# Patient Record
Sex: FEMALE | Race: WHITE | HISPANIC OR LATINO | URBAN - METROPOLITAN AREA
[De-identification: names, ages, dates, MRNs, and addresses within clinical notes are randomized per-mention and may not be internally consistent; named-entity substitution may affect disease eponyms.]

---

## 2018-10-09 ENCOUNTER — OUTPATIENT (OUTPATIENT)
Dept: OUTPATIENT SERVICES | Facility: HOSPITAL | Age: 33
LOS: 1 days | Discharge: HOME | End: 2018-10-09

## 2018-10-09 ENCOUNTER — APPOINTMENT (OUTPATIENT)
Dept: ANTEPARTUM | Facility: CLINIC | Age: 33
End: 2018-10-09

## 2018-11-01 ENCOUNTER — OUTPATIENT (OUTPATIENT)
Dept: OUTPATIENT SERVICES | Facility: HOSPITAL | Age: 33
LOS: 1 days | Discharge: HOME | End: 2018-11-01

## 2018-11-01 ENCOUNTER — APPOINTMENT (OUTPATIENT)
Dept: ANTEPARTUM | Facility: CLINIC | Age: 33
End: 2018-11-01

## 2018-12-20 ENCOUNTER — APPOINTMENT (OUTPATIENT)
Dept: ANTEPARTUM | Facility: CLINIC | Age: 33
End: 2018-12-20

## 2018-12-20 ENCOUNTER — RESULT CHARGE (OUTPATIENT)
Age: 33
End: 2018-12-20

## 2018-12-20 ENCOUNTER — OUTPATIENT (OUTPATIENT)
Dept: OUTPATIENT SERVICES | Facility: HOSPITAL | Age: 33
LOS: 1 days | Discharge: HOME | End: 2018-12-20

## 2018-12-20 VITALS
WEIGHT: 164.5 LBS | BODY MASS INDEX: 30.27 KG/M2 | TEMPERATURE: 97.7 F | HEART RATE: 89 BPM | HEIGHT: 62 IN | OXYGEN SATURATION: 99 % | DIASTOLIC BLOOD PRESSURE: 68 MMHG | SYSTOLIC BLOOD PRESSURE: 116 MMHG

## 2018-12-20 DIAGNOSIS — Z87.09 PERSONAL HISTORY OF OTHER DISEASES OF THE RESPIRATORY SYSTEM: ICD-10-CM

## 2018-12-20 DIAGNOSIS — Z78.9 OTHER SPECIFIED HEALTH STATUS: ICD-10-CM

## 2018-12-20 LAB
BILIRUB UR QL STRIP: NEGATIVE
CLARITY UR: NORMAL
COLLECTION METHOD: NORMAL
FETAL MOVEMENT: PRESENT
GLUCOSE UR-MCNC: NEGATIVE
HCG UR QL: 0.2 EU/DL
HGB UR QL STRIP.AUTO: NEGATIVE
KETONES UR-MCNC: NEGATIVE
LEUKOCYTE ESTERASE UR QL STRIP: NEGATIVE
NITRITE UR QL STRIP: NEGATIVE
OB COMMENTS: NORMAL
PH UR STRIP: 6.5
PROT UR STRIP-MCNC: 30
SCHEDULED VISIT: YES
SP GR UR STRIP: 1.02
URINE ALBUMIN/PROTEIN: 30
URINE GLUCOSE: NEGATIVE
URINE KETONES: NEGATIVE
WEEKS GESTATION: NORMAL

## 2018-12-20 RX ORDER — CLOTRIMAZOLE 1% ANTIFUNGAL CREAM 1 G/100G
1 CREAM TOPICAL
Qty: 28 | Refills: 0 | Status: DISCONTINUED | COMMUNITY
Start: 2018-12-04

## 2018-12-20 RX ORDER — NITROFURANTOIN (MONOHYDRATE/MACROCRYSTALS) 25; 75 MG/1; MG/1
100 CAPSULE ORAL
Qty: 14 | Refills: 0 | Status: DISCONTINUED | COMMUNITY
Start: 2018-12-06

## 2018-12-20 RX ORDER — METRONIDAZOLE 500 MG/1
500 TABLET ORAL
Qty: 4 | Refills: 0 | Status: DISCONTINUED | COMMUNITY
Start: 2018-12-13

## 2018-12-20 RX ORDER — METRONIDAZOLE 7.5 MG/G
0.75 GEL VAGINAL
Qty: 70 | Refills: 0 | Status: DISCONTINUED | COMMUNITY
Start: 2018-10-04

## 2019-02-14 ENCOUNTER — APPOINTMENT (OUTPATIENT)
Dept: ANTEPARTUM | Facility: CLINIC | Age: 34
End: 2019-02-14

## 2019-03-05 ENCOUNTER — APPOINTMENT (OUTPATIENT)
Dept: ANTEPARTUM | Facility: CLINIC | Age: 34
End: 2019-03-05

## 2019-03-19 ENCOUNTER — APPOINTMENT (OUTPATIENT)
Dept: ANTEPARTUM | Facility: CLINIC | Age: 34
End: 2019-03-19

## 2019-03-26 ENCOUNTER — OUTPATIENT (OUTPATIENT)
Dept: OUTPATIENT SERVICES | Facility: HOSPITAL | Age: 34
LOS: 1 days | Discharge: HOME | End: 2019-03-26

## 2019-03-26 VITALS — SYSTOLIC BLOOD PRESSURE: 131 MMHG | DIASTOLIC BLOOD PRESSURE: 73 MMHG | HEART RATE: 107 BPM | TEMPERATURE: 98 F

## 2019-03-26 VITALS — DIASTOLIC BLOOD PRESSURE: 73 MMHG | SYSTOLIC BLOOD PRESSURE: 131 MMHG | RESPIRATION RATE: 16 BRPM | HEART RATE: 107 BPM

## 2019-03-26 NOTE — OB PROVIDER TRIAGE NOTE - NSHPLABSRESULTS_GEN_ALL_CORE
SONO @ 19w5d: cephalic, 3vc, posterior placenta, normal cervix, , FHR 138bpm, normal anatomy   SONO @ 12w5d: single IUP, cervical length 43mm, CRL 68.85mm, FHR 142bpm SONO @ 19w5d: cephalic, 3vc, posterior placenta, normal cervix, , FHR 138bpm, normal anatomy   SONO @ 12w5d: single IUP, cervical length 43mm, CRL 68.85mm, FHR 142bpm    No Prenatal Chart    12/7/18  HepBSAg Negative  RPR NR  HIV Neg  Rubella/ Varicella Immune  OPOS

## 2019-03-26 NOTE — OB PROVIDER TRIAGE NOTE - HISTORY OF PRESENT ILLNESS
34yo  at 33w3d, dated by LMP confirmed with 1st trimester ultrasound, who presents with hands and feet swelling, present for 1 week, with numbness and tingling in her hands. She states her numbness radiates from her elbows to thumbs, and is mostly present in her thumbs. She reports the numbness is worse at nighttime. No alleviating factors. She is currently not working, but used to work on [employer]. Denies contractions, pain with urination, vaginal bleeding, loss of fluid, chest pain, SOB. She reports her feet pain is more of, worse with sunlight. Denies nausea or vomiting. Reports blurry vision only when watching TV, denies scotoma. Denies history of HTN, She has not done her GCT yet. Last apt was today, 19. Takes albuterol for ashtma, TID on average, no previous hospitalizations or intubations. 32yo  at 33w3d, dated by LMP confirmed with 1st trimester ultrasound, who presents with hands and feet swelling, present for 1 week, with numbness and tingling in her hands. She states her numbness radiates from her elbows to thumbs, and is mostly present in her thumbs. She reports the numbness is worse at nighttime. No alleviating factors. 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 Denies contractions, pain with urination, vaginal bleeding, loss of fluid, chest pain, SOB. Denies history of HTN, She has not done her GCT yet. Last apt was today, 19. Takes albuterol for Asthma, TID on average, no previous hospitalizations or intubations.  Last sexual intercourse 1 week ago, last bowel movement today. Saw pulmonology in february no change to current medication regimen, missed apt scheduled in March, will be following up. Has been non-compliant with prenatal care, next ObGyn apt on 4/3/19 with SEAN.

## 2019-03-26 NOTE — OB PROVIDER TRIAGE NOTE - NSOBPROVIDERNOTE_OBGYN_ALL_OB_FT
32yo  at 33w3d, with asthma on daily albuterol,    Dr. Pierson and Dr. Mendez to be aware 32yo  at 33w3d, with severe persistent asthma on daily albuterol, likely with carpel tunnel syndrome, with BPP 8/8, and reassuring maternal and fetal wellbeing, not in  labor.   -Discharge with labor precautions  -Follow up with [facility], high risk clinic for prenatal care   -Fetal Kick Counts encouraged   -PO hydration  -Nighttime wrist splints encouraged   -F/u with Pulmonology   -Compliance with prenatal care emphasized to patient. Risks of noncompliance discussed, including, but not limited to infection, bleeding and fetal death.     Dr. Pierson and Dr. Mendez aware

## 2019-03-26 NOTE — OB PROVIDER TRIAGE NOTE - NS_OBGYNHISTORY_OBGYN_ALL_OB_FT
OB: NSVDX3, term, 40wx3 (RUMC +Bron[place]X2) ETOP with D+E @ 19w, ETOPX3 with D+C  Gyn: Denies history of abnormal papsmears, STIs, uterine fibroids or ovarian cysts.

## 2019-03-26 NOTE — OB PROVIDER TRIAGE NOTE - NSHPPHYSICALEXAM_GEN_ALL_CORE
Vital Signs Last 24 Hrs  T(C): 36.9 (26 Mar 2019 18:57), Max: 36.9 (26 Mar 2019 18:57)  T(F): 98.5 (26 Mar 2019 18:57), Max: 98.5 (26 Mar 2019 18:57)  HR: 107 (26 Mar 2019 19:10) (107 - 107)  BP: 131/73 (26 Mar 2019 19:10) (131/73 - 131/73)  RR: 16 (26 Mar 2019 19:10) (16 - 16)    Gen: A+OX3. NAD  Abd: soft, nontender, gravid, mild palpable contractions  EFM: 140bpm/ mod martin/ accels+/cat 1  TOCO irregular, irritability   SVE  Speculum exam    UDIP  small leuks Vital Signs Last 24 Hrs  T(C): 36.9 (26 Mar 2019 18:57), Max: 36.9 (26 Mar 2019 18:57)  T(F): 98.5 (26 Mar 2019 18:57), Max: 98.5 (26 Mar 2019 18:57)  HR: 107 (26 Mar 2019 19:10) (107 - 107); Manual 96   BP: 131/73 (26 Mar 2019 19:10) (131/73 - 131/73)  RR: 16 (26 Mar 2019 19:10) (16 - 16)    Gen: A+OX3. NAD  CV: S1+S2. RRR  Pulm: CTAB. No wheezes.   Abd: soft, nontender, gravid, mild palpable contractions  EFM: 140bpm/ mod martin/ accels+/cat 1  TOCO irregular, irritability   SVE 0.5/long/ posterior/ breech  Speculum exam: No bleeding noted   TVUS: cervical length 3.51cm     UDIP  small leuks

## 2019-04-03 ENCOUNTER — OUTPATIENT (OUTPATIENT)
Dept: OUTPATIENT SERVICES | Facility: HOSPITAL | Age: 34
LOS: 1 days | Discharge: HOME | End: 2019-04-03

## 2019-04-03 ENCOUNTER — APPOINTMENT (OUTPATIENT)
Dept: ANTEPARTUM | Facility: CLINIC | Age: 34
End: 2019-04-03

## 2019-04-03 ENCOUNTER — RESULT CHARGE (OUTPATIENT)
Age: 34
End: 2019-04-03

## 2019-04-03 DIAGNOSIS — Z78.9 OTHER SPECIFIED HEALTH STATUS: ICD-10-CM

## 2019-04-03 LAB
BILIRUB UR QL STRIP: NEGATIVE
CLARITY UR: CLEAR
COLLECTION METHOD: NORMAL
FETAL HEART RATE (BPM): 153
FETAL MOVEMENT: PRESENT
GLUCOSE UR-MCNC: NEGATIVE
HCG UR QL: 0.2 EU/DL
HGB UR QL STRIP.AUTO: NEGATIVE
KETONES UR-MCNC: NORMAL
LEUKOCYTE ESTERASE UR QL STRIP: NEGATIVE
NITRITE UR QL STRIP: NEGATIVE
PH UR STRIP: 6
PROT UR STRIP-MCNC: 2
SP GR UR STRIP: 1.03
URINE ALBUMIN/PROTEIN: 2
URINE GLUCOSE: NEGATIVE
URINE KETONES: NORMAL

## 2019-04-03 RX ORDER — ALBUTEROL SULFATE 90 UG/1
108 (90 BASE) AEROSOL, METERED RESPIRATORY (INHALATION)
Qty: 18 | Refills: 0 | Status: COMPLETED | COMMUNITY
Start: 2018-12-13 | End: 2019-04-03

## 2019-04-03 RX ORDER — MONTELUKAST 10 MG/1
10 TABLET, FILM COATED ORAL
Qty: 30 | Refills: 0 | Status: COMPLETED | COMMUNITY
Start: 2018-11-15 | End: 2019-04-03

## 2019-04-03 NOTE — PHYSICAL EXAM
[FreeTextEntry1] : Gen: NAD\par Cardio: rrr, s1/s2 normal\par Pulm: cta bilaterally\par Abd: gravid, nontender, no palpable contractions, fundal height 33\par Ext: no edema, no calf tenderness

## 2019-04-03 NOTE — DISCUSSION/SUMMARY
[FreeTextEntry1] : Pt is 33  at 34ks4d here transfer from [facility] for prenatal care and mgmt of asthma\par \par #asthma\par -c/w albuterol prn, self d/c ventolin ok for now given lack of symptoms\par -continue following with pulm\par  labor/delivery, increased risk of exacerbations and need for possible maternal intubation.\par -will need serial EFW scans qmonth, and weekly BPPs starting at 32wks\par \par #pregnancy\par -pt transferring care to [facility]\par -will obtain prenatal chart from [facility] for review\par \par #carpal tunnel\par -recc wrist splints\par -tylenol PM for sleep\par \par #nausea/vomiting\par - ketonuria\par - encouraged to stay hydrated and eat small bland meals\par - if symptoms do not improve or worsen and not able to tolerate liquid pt to go to the hospital\par \par #proteinuria\par -udip 2+ today\par -BP ok today will monitor\par -will review BPs and urine in prenatal record when received\par \par  labor precautions and instructions given, FKC instructions given\par RTC in 1wk

## 2019-04-03 NOTE — HISTORY OF PRESENT ILLNESS
[FreeTextEntry1] : Pt is 33  at [id number]  previously seen as consult from Dr Simon at [facility] for asthma and possible thyroid dysfuction.  Pt presents today for routine OB care, says that when her doctor left [facility] she was told she could no longer see an OB there, so she has not been followed by anyone since 2019.  Pt also has a child who recently had surgery and she was spending time at home with him.\par \par Pt called the office 3/26 c/o swelling and was encouraged to go to the hospital.  Pt was evaluated on L&D and found to have bilateral carpal tunnel and was recommended wrist splints which she has been using without much symptom relief.  Pt also taking tylenol for the pain which is not helping.  Pt reports the numbness and tingling is interfering with her sleeping at night.  Recommended tylenol PM to help with discomfort and sleep and to continue with the wrist splints.\par \par Pt asthma much improved since initial consult.  Has albuterol pump and ventolin at home which she has not had to use.  Last albuterol use was 2wks ago.  Pt has a pulmonologist with whom she follows, last appt 2019.\par \par Pt also was told by previous gyn that she may have thyroid problem, had TFTs performed by OB at [facility] that were normal (per pt).  Pt has never been told she had a thyroid condition before and does not have symptoms of hypo or hypothyroidism.\par \par Pt also notes that for the last 2 days she has had some nausea/vomiting associated with meals, unable to keep down solid or liquids.  Pt denies diarrhea, fevers/chills, travel, changes in diet.  This morning pt notes that she is feeling better and was able to tolerate sandwich and fruit without vomiting and does not feel nauseous at this time.  Pt with ketones in the urine, encouraged to drink more fluids and stay hydrated.\par \par /83 today, udip 2+ protein.  Pt asked about BPs this pregnancy, says she was told at one visit her blood pressure was high and at another visit that it was low.  Denies headaches, vision changes, RUQ/epigastric pain, sudden increased swelling extremities.

## 2019-04-04 DIAGNOSIS — O99.513 DISEASES OF THE RESPIRATORY SYSTEM COMPLICATING PREGNANCY, THIRD TRIMESTER: ICD-10-CM

## 2019-04-04 DIAGNOSIS — J45.20 MILD INTERMITTENT ASTHMA, UNCOMPLICATED: ICD-10-CM

## 2019-04-04 DIAGNOSIS — O09.93 SUPERVISION OF HIGH RISK PREGNANCY, UNSPECIFIED, THIRD TRIMESTER: ICD-10-CM

## 2019-04-04 DIAGNOSIS — Z3A.34 34 WEEKS GESTATION OF PREGNANCY: ICD-10-CM

## 2019-04-04 DIAGNOSIS — O35.8XX0 MATERNAL CARE FOR OTHER (SUSPECTED) FETAL ABNORMALITY AND DAMAGE, NOT APPLICABLE OR UNSPECIFIED: ICD-10-CM

## 2019-04-10 ENCOUNTER — APPOINTMENT (OUTPATIENT)
Dept: ANTEPARTUM | Facility: CLINIC | Age: 34
End: 2019-04-10

## 2019-04-10 ENCOUNTER — OUTPATIENT (OUTPATIENT)
Dept: OUTPATIENT SERVICES | Facility: HOSPITAL | Age: 34
LOS: 1 days | Discharge: HOME | End: 2019-04-10

## 2019-04-10 VITALS
WEIGHT: 183 LBS | HEART RATE: 98 BPM | OXYGEN SATURATION: 100 % | BODY MASS INDEX: 33.47 KG/M2 | TEMPERATURE: 98.1 F | DIASTOLIC BLOOD PRESSURE: 79 MMHG | SYSTOLIC BLOOD PRESSURE: 134 MMHG

## 2019-04-10 DIAGNOSIS — Z34.90 ENCOUNTER FOR SUPERVISION OF NORMAL PREGNANCY, UNSPECIFIED, UNSPECIFIED TRIMESTER: ICD-10-CM

## 2019-04-10 LAB
BILIRUB UR QL STRIP: NEGATIVE
CLARITY UR: CLEAR
COLLECTION METHOD: NORMAL
FETAL HEART DESCRIPTION: NORMAL
FETAL HEART RATE (BPM): 132
FETAL MOVEMENT: PRESENT
GLUCOSE UR-MCNC: NEGATIVE
HCG UR QL: 0.2 EU/DL
HGB UR QL STRIP.AUTO: NEGATIVE
KETONES UR-MCNC: NEGATIVE
LEUKOCYTE ESTERASE UR QL STRIP: NEGATIVE
NITRITE UR QL STRIP: NEGATIVE
PH UR STRIP: 6.5
PROT UR STRIP-MCNC: NORMAL
SCHEDULED VISIT: YES
SP GR UR STRIP: 1.01
URINE ALBUMIN/PROTEIN: NORMAL
URINE GLUCOSE: NEGATIVE
URINE KETONES: NEGATIVE
WEEKS GESTATION: 35.4

## 2019-04-10 RX ORDER — CETIRIZINE HYDROCHLORIDE 10 MG/1
10 TABLET, COATED ORAL
Qty: 10 | Refills: 0 | Status: COMPLETED | COMMUNITY
Start: 2019-01-18

## 2019-04-10 RX ORDER — CALCIPOTRIENE 50 UG/G
0.01 CREAM TOPICAL
Qty: 60 | Refills: 0 | Status: COMPLETED | COMMUNITY
Start: 2018-12-05

## 2019-04-11 DIAGNOSIS — J45.20 MILD INTERMITTENT ASTHMA, UNCOMPLICATED: ICD-10-CM

## 2019-04-11 DIAGNOSIS — O99.513 DISEASES OF THE RESPIRATORY SYSTEM COMPLICATING PREGNANCY, THIRD TRIMESTER: ICD-10-CM

## 2019-04-11 DIAGNOSIS — Z3A.35 35 WEEKS GESTATION OF PREGNANCY: ICD-10-CM

## 2019-04-11 DIAGNOSIS — O12.13 GESTATIONAL PROTEINURIA, THIRD TRIMESTER: ICD-10-CM

## 2019-04-11 DIAGNOSIS — O09.93 SUPERVISION OF HIGH RISK PREGNANCY, UNSPECIFIED, THIRD TRIMESTER: ICD-10-CM

## 2019-04-11 NOTE — DISCUSSION/SUMMARY
[FreeTextEntry1] : Pt is 33  at 35wk4d here transfer from [facility] for prenatal care and mgmt of asthma\par \par Gen: NAD, voice hoarse\par Cardio: rrr, s1/s2 normal\par Pulm: cta b/l\par Abd: gravid, nontender, no palpable contractions\par Ext: 1+ edema, no calf tenderness\par \par #asthma\par -c/w albuterol prn, self d/c ventolin\par -symptoms worse the past week, daily albuterol use, has URI, no fevers, cough nonproductive, lungs clear no wheezing, recc decongestants\par -continue following with pulm\par -will need serial EFW scans qmonth, last 4/3/19 EFW 2462g, vtx, MVP 53mm, BPP 8/8\par \par #pregnancy\par -pt transferring care to [facility]\par -will obtain prenatal chart from [facility] for review --> received labs, contacted again to get notes from prenatal visit\par -GBS, GC/Chl collected today, script for 3rd trimester HIV\par \par #carpal tunnel\par -using wrist splints and symptoms much improved\par \par #nausea/vomiting\par - resolved\par \par #proteinuria\par -udip 2+ again today, normal BP\par -will send for preeclamptic labwork\par \par  labor precautions and instructions given, FKC instructions given\par RTC in 1wk

## 2019-04-12 ENCOUNTER — RESULT REVIEW (OUTPATIENT)
Age: 34
End: 2019-04-12

## 2019-04-12 LAB
C TRACH RRNA SPEC QL NAA+PROBE: NOT DETECTED
GP B STREP DNA SPEC QL NAA+PROBE: DETECTED
GP B STREP DNA SPEC QL NAA+PROBE: NORMAL
N GONORRHOEA RRNA SPEC QL NAA+PROBE: NOT DETECTED
SOURCE AMPLIFICATION: NORMAL
SOURCE GBS: NORMAL

## 2019-04-17 ENCOUNTER — APPOINTMENT (OUTPATIENT)
Dept: ANTEPARTUM | Facility: CLINIC | Age: 34
End: 2019-04-17

## 2019-04-19 ENCOUNTER — OTHER (OUTPATIENT)
Age: 34
End: 2019-04-19

## 2019-04-26 ENCOUNTER — RESULT CHARGE (OUTPATIENT)
Age: 34
End: 2019-04-26

## 2019-04-26 ENCOUNTER — APPOINTMENT (OUTPATIENT)
Dept: ANTEPARTUM | Facility: CLINIC | Age: 34
End: 2019-04-26

## 2019-04-26 ENCOUNTER — OUTPATIENT (OUTPATIENT)
Dept: OUTPATIENT SERVICES | Facility: HOSPITAL | Age: 34
LOS: 1 days | Discharge: HOME | End: 2019-04-26

## 2019-04-26 VITALS
TEMPERATURE: 98.1 F | BODY MASS INDEX: 33.56 KG/M2 | SYSTOLIC BLOOD PRESSURE: 149 MMHG | OXYGEN SATURATION: 98 % | HEART RATE: 100 BPM | DIASTOLIC BLOOD PRESSURE: 80 MMHG | WEIGHT: 183.5 LBS

## 2019-04-26 VITALS — DIASTOLIC BLOOD PRESSURE: 89 MMHG | SYSTOLIC BLOOD PRESSURE: 135 MMHG | TEMPERATURE: 98 F

## 2019-04-26 VITALS — DIASTOLIC BLOOD PRESSURE: 60 MMHG | SYSTOLIC BLOOD PRESSURE: 129 MMHG | HEART RATE: 102 BPM

## 2019-04-26 DIAGNOSIS — J45.20 MILD INTERMITTENT ASTHMA, UNCOMPLICATED: ICD-10-CM

## 2019-04-26 DIAGNOSIS — O12.13 GESTATIONAL PROTEINURIA, THIRD TRIMESTER: ICD-10-CM

## 2019-04-26 DIAGNOSIS — O09.93 SUPERVISION OF HIGH RISK PREGNANCY, UNSPECIFIED, THIRD TRIMESTER: ICD-10-CM

## 2019-04-26 DIAGNOSIS — Z3A.37 37 WEEKS GESTATION OF PREGNANCY: ICD-10-CM

## 2019-04-26 LAB
ALBUMIN SERPL ELPH-MCNC: 3.2 G/DL — LOW (ref 3.5–5.2)
ALP SERPL-CCNC: 147 U/L — HIGH (ref 30–115)
ALT FLD-CCNC: 6 U/L — SIGNIFICANT CHANGE UP (ref 0–41)
AMPHET UR-MCNC: NEGATIVE — SIGNIFICANT CHANGE UP
ANION GAP SERPL CALC-SCNC: 13 MMOL/L — SIGNIFICANT CHANGE UP (ref 7–14)
APPEARANCE UR: ABNORMAL
AST SERPL-CCNC: 13 U/L — SIGNIFICANT CHANGE UP (ref 0–41)
BACTERIA # UR AUTO: ABNORMAL /HPF
BARBITURATES UR SCN-MCNC: NEGATIVE — SIGNIFICANT CHANGE UP
BENZODIAZ UR-MCNC: NEGATIVE — SIGNIFICANT CHANGE UP
BILIRUB SERPL-MCNC: 0.2 MG/DL — SIGNIFICANT CHANGE UP (ref 0.2–1.2)
BILIRUB UR QL STRIP: NEGATIVE
BILIRUB UR-MCNC: NEGATIVE — SIGNIFICANT CHANGE UP
BLD GP AB SCN SERPL QL: SIGNIFICANT CHANGE UP
BUN SERPL-MCNC: 8 MG/DL — LOW (ref 10–20)
BUPRENORPHINE SCREEN, URINE RESULT: NEGATIVE — SIGNIFICANT CHANGE UP
CALCIUM SERPL-MCNC: 8.7 MG/DL — SIGNIFICANT CHANGE UP (ref 8.5–10.1)
CHLORIDE SERPL-SCNC: 104 MMOL/L — SIGNIFICANT CHANGE UP (ref 98–110)
CLARITY UR: CLEAR
CO2 SERPL-SCNC: 19 MMOL/L — SIGNIFICANT CHANGE UP (ref 17–32)
COCAINE METAB.OTHER UR-MCNC: NEGATIVE — SIGNIFICANT CHANGE UP
COLLECTION METHOD: NORMAL
COLOR SPEC: YELLOW — SIGNIFICANT CHANGE UP
CREAT ?TM UR-MCNC: 96 MG/DL — SIGNIFICANT CHANGE UP
CREAT SERPL-MCNC: <0.5 MG/DL — LOW (ref 0.7–1.5)
DIFF PNL FLD: NEGATIVE — SIGNIFICANT CHANGE UP
EPI CELLS # UR: ABNORMAL /HPF
FETAL HEART DESCRIPTION: NORMAL
FETAL HEART RATE (BPM): 140
FETAL MOVEMENT: PRESENT
GLUCOSE SERPL-MCNC: 82 MG/DL — SIGNIFICANT CHANGE UP (ref 70–99)
GLUCOSE UR QL: NEGATIVE MG/DL — SIGNIFICANT CHANGE UP
GLUCOSE UR-MCNC: NEGATIVE
HCG UR QL: 0.2 EU/DL
HCT VFR BLD CALC: 31.5 % — LOW (ref 37–47)
HGB BLD-MCNC: 10 G/DL — LOW (ref 12–16)
HGB UR QL STRIP.AUTO: NEGATIVE
HIV 1 & 2 AB SERPL IA.RAPID: SIGNIFICANT CHANGE UP
KETONES UR-MCNC: NEGATIVE
KETONES UR-MCNC: NEGATIVE — SIGNIFICANT CHANGE UP
L&D DRUG SCREEN, URINE: SIGNIFICANT CHANGE UP
LDH SERPL L TO P-CCNC: 204 — SIGNIFICANT CHANGE UP (ref 50–242)
LEUKOCYTE ESTERASE UR QL STRIP: NEGATIVE
LEUKOCYTE ESTERASE UR-ACNC: ABNORMAL
MCHC RBC-ENTMCNC: 25.2 PG — LOW (ref 27–31)
MCHC RBC-ENTMCNC: 31.7 G/DL — LOW (ref 32–37)
MCV RBC AUTO: 79.3 FL — LOW (ref 81–99)
METHADONE UR-MCNC: NEGATIVE — SIGNIFICANT CHANGE UP
NITRITE UR QL STRIP: NEGATIVE
NITRITE UR-MCNC: NEGATIVE — SIGNIFICANT CHANGE UP
NRBC # BLD: 0 /100 WBCS — SIGNIFICANT CHANGE UP (ref 0–0)
OB COMMENTS: NORMAL
OPIATES UR-MCNC: NEGATIVE — SIGNIFICANT CHANGE UP
OXYCODONE UR-MCNC: NEGATIVE — SIGNIFICANT CHANGE UP
PCP UR-MCNC: NEGATIVE — SIGNIFICANT CHANGE UP
PH UR STRIP: 7
PH UR: 7 — SIGNIFICANT CHANGE UP (ref 5–8)
PLATELET # BLD AUTO: 321 K/UL — SIGNIFICANT CHANGE UP (ref 130–400)
POTASSIUM SERPL-MCNC: 4 MMOL/L — SIGNIFICANT CHANGE UP (ref 3.5–5)
POTASSIUM SERPL-SCNC: 4 MMOL/L — SIGNIFICANT CHANGE UP (ref 3.5–5)
PRENATAL SYPHILIS TEST: SIGNIFICANT CHANGE UP
PROPOXYPHENE QUALITATIVE URINE RESULT: NEGATIVE — SIGNIFICANT CHANGE UP
PROT ?TM UR-MCNC: 48 MG/DLG/24H — SIGNIFICANT CHANGE UP
PROT SERPL-MCNC: 6.2 G/DL — SIGNIFICANT CHANGE UP (ref 6–8)
PROT UR STRIP-MCNC: 30
PROT UR-MCNC: 30 MG/DL
PROT/CREAT UR-RTO: 0.5 RATIO — HIGH (ref 0–0.2)
RBC # BLD: 3.97 M/UL — LOW (ref 4.2–5.4)
RBC # FLD: 14.9 % — HIGH (ref 11.5–14.5)
SCHEDULED VISIT: YES
SODIUM SERPL-SCNC: 136 MMOL/L — SIGNIFICANT CHANGE UP (ref 135–146)
SP GR SPEC: 1.02 — SIGNIFICANT CHANGE UP (ref 1.01–1.03)
SP GR UR STRIP: 1.03
TYPE + AB SCN PNL BLD: SIGNIFICANT CHANGE UP
URATE SERPL-MCNC: 3.9 MG/DL — SIGNIFICANT CHANGE UP (ref 2.5–7)
URINE ALBUMIN/PROTEIN: 30
URINE GLUCOSE: NEGATIVE
URINE KETONES: NEGATIVE
UROBILINOGEN FLD QL: 1 MG/DL (ref 0.2–0.2)
WBC # BLD: 14.36 K/UL — HIGH (ref 4.8–10.8)
WBC # FLD AUTO: 14.36 K/UL — HIGH (ref 4.8–10.8)
WBC UR QL: SIGNIFICANT CHANGE UP /HPF
WEEKS GESTATION: 37.6

## 2019-04-26 NOTE — PROGRESS NOTE ADULT - ASSESSMENT
32 yo  @37w6d, GBS pos, h/o asthma now well controlled, s/p prolonged BP monitoring, no criteria met, doing well.    - d/c to home  - f/up at next scheduled appointment at high risk clinic  - labor precautions  - [facility]KC instructions, maternal hydration encouraged  - if experiencing pre-eclamptic symptoms or elevated BP >140/90, return for evaluation    Dr. Knight and Dr. Bañuelos aware.

## 2019-04-26 NOTE — OB PROVIDER TRIAGE NOTE - ADDITIONAL INSTRUCTIONS
- f/up at next scheduled appointment at high risk clinic  - labor precautions  - FKC instructions, maternal hydration encouraged  - if experiencing pre-eclamptic symptoms or elevated BP >140/90, return for evaluation

## 2019-04-26 NOTE — OB PROVIDER TRIAGE NOTE - NSOBPROVIDERNOTE_OBGYN_ALL_OB_FT
34 y/o  at 37w6d GA, GBS positive, h/o asthma now well controlled, for prolonged BP monitoring, r/o gHTN vs preeclampsia.   - transfer to recovery room  - BPs q15 min  - continuous EFM and toco  - preeclamptic labs  - bedrest with bathroom privleges  - regular diet  - if meets criteria for gHTN or preeclampsia will proceed with IOL    Dr. Knight and Dr. Bourgeois to be made aware.

## 2019-04-26 NOTE — OB PROVIDER TRIAGE NOTE - NS_OBGYNHISTORY_OBGYN_ALL_OB_FT
OB Hx: NSVDX3, full term, no complications, largest 7-6  ETOPX4, D&CX3, D&EX1 at 19 weeks  Gyn Hx: Denies cysts, fibroids, abnormal PAP smears and STDs.

## 2019-04-26 NOTE — OB PROVIDER TRIAGE NOTE - HISTORY OF PRESENT ILLNESS
32 y/o  at 37w6d dated by LMP c/w first trimester sonogram, presents for blood pressure monitoring. Patient follows at high risk clinic for asthma control. Today had an elevated blood pressure of 149/80. Denies elevated blood pressures prior in this pregnancy or previous pregnancies. Denies headache, blurry vision, chest pain, SOB, epigastric pain and leg swelling. Reports irregular contractions in the past few days 5/10 in intensity. Denies vaginal bleeding and LOF. Feels good fetal movements. No complications this pregnancy. Patient has a h/o asthma, never hospitalized or intubated. Early in the pregnancy her asthma was very uncontrolled, was on albuterol, singulair and steroid pump. Recently asthma has improved significantly. Last used albuterol inhaler in the beginning of 2019. Not currently taking her other asthma meds. H/o carpal tunnel syndrome this pregnancy, improved with use of splints. GBS positive

## 2019-04-26 NOTE — OB PROVIDER TRIAGE NOTE - NSHPLABSRESULTS_GEN_ALL_CORE
GBS positive  RPR NR  rubella immune  varicella immune  O positive, antibody screen negative  quantiferon negative  HIV NR  Hbs Ag NR        Sonograms:  34w4d - cephalic, posterior placenta, MVP 53mm, EFW 2462 grams (46%ile), BPP 8/8

## 2019-04-26 NOTE — PROGRESS NOTE ADULT - SUBJECTIVE AND OBJECTIVE BOX
PGY I Note    S: Pt seen and evaluated at bedside. Doing well, no pain. Denies HA, blurry vision, CP, SOB, N/V, RUQ/epigastric pain, LE pain.    Vital Signs Last 24 Hrs  T(F): 98.6 (2019 11:05), Max: 98.6 (2019 11:05)  HR: 102 (2019 16:05) (83 - 106)  BP: 129/60 (2019 16:05) (117/76 - 139/90)  RR: 18 (2019 11:05) (18 - 18)    EFM: 130/mod martin/+accel  TOCO: irregular  SVE: deferred, /-2 @1000    Labs:                        10.0   14.36 )-----------( 321      ( 2019 11:10 )             31.5           136  |  104  |  8<L>  ----------------------------<  82  4.0   |  19  |  <0.5<L>    Ca    8.7      2019 11:10    TPro  6.2  /  Alb  3.2<L>  /  TBili  0.2  /  DBili  x   /  AST  13  /  ALT  6   /  AlkPhos  147<H>        Urinalysis Basic - ( 2019 11:10 )    Color: Yellow / Appearance: Cloudy / S.020 / pH: x  Gluc: x / Ketone: Negative  / Bili: Negative / Urobili: 1.0 mg/dL   Blood: x / Protein: 30 mg/dL / Nitrite: Negative   Leuk Esterase: Small / RBC: x / WBC 3-5 /HPF   Sq Epi: x / Non Sq Epi: Occasional /HPF / Bacteria: Few /HPF        Prenatal Syphilis Test: Nonreact (19 @ 11:19)      Meds:

## 2019-04-26 NOTE — OB PROVIDER TRIAGE NOTE - NSHPPHYSICALEXAM_GEN_ALL_CORE
Vital Signs Last 24 Hrs  T(C): 37 (26 Apr 2019 11:05), Max: 37 (26 Apr 2019 11:05)  T(F): 98.6 (26 Apr 2019 11:05), Max: 98.6 (26 Apr 2019 11:05)  HR: 96 (26 Apr 2019 11:36) (93 - 100)  BP: 134/90 (26 Apr 2019 11:36) (134/90 - 139/90)  BP(mean): --  RR: 18 (26 Apr 2019 11:05) (18 - 18)  SpO2: --  Udip: 30 protein    GEN: NAD, AAOX3  CVS: RRR, normal S1/S2  lungs: CTAB  abd: soft, gravid, nontender, no palpable ctx  EFM: 135/mod/accels+  toco: q2-5 min.  SVE: 2/50/-2, cephalic, intact  EFW: 3600 grams    Bedside sonogram at the high risk clinic today: vertex, posterior placenta, MVP 50mm, BPP 8/8

## 2019-04-26 NOTE — DISCUSSION/SUMMARY
[FreeTextEntry1] : Pt is 33  at 37wk6d here transfer from [facility] for prenatal care and mgmt of asthma. Denies any preeclamptic symptoms today. Only reports occasional strong cramping, now resolved. No VB, LOF. Good FM.\par \par #asthma\par -c/w albuterol prn, self d/c ventolin\par -symptoms better than last visit, using albuterol pump occasionally, 4x in last month per patient, no wheezing today, lung exam clear\par -continue following with pulm\par -will need serial EFW scans qmonth, last 4/3/19 EFW 2462g, vtx, MVP 53mm, BPP \par \par #pregnancy\par -will obtain prenatal chart from [facility] for review --> received labs, contacted again to get notes from prenatal visit\par -GBS positive, Gc/Ch neg, patient aware\par -did not go for third trim lasbs\par \par #carpal tunnel\par -using wrist splints and symptoms much improved\par \par #nausea/vomiting\par - resolved\par \par #elevated BP\par -udip 30 protein\par -did not do baseline PELs last visit bc of car problems\par -/80\par -recommend that patient go to L&D now to rule out preeclampsia.\par - BPP today , MVP 50mm, ceph, posterior placenta\par \par Labor and preeclamptic precautions and instructions given, FKC instructions given\par RTC in 1wk if not delivered

## 2019-05-01 ENCOUNTER — OUTPATIENT (OUTPATIENT)
Dept: OUTPATIENT SERVICES | Facility: HOSPITAL | Age: 34
LOS: 1 days | End: 2019-05-01
Payer: COMMERCIAL

## 2019-05-01 PROCEDURE — G9001: CPT

## 2019-05-03 ENCOUNTER — APPOINTMENT (OUTPATIENT)
Dept: ANTEPARTUM | Facility: CLINIC | Age: 34
End: 2019-05-03
Payer: MEDICAID

## 2019-05-03 ENCOUNTER — OUTPATIENT (OUTPATIENT)
Dept: OUTPATIENT SERVICES | Facility: HOSPITAL | Age: 34
LOS: 1 days | Discharge: HOME | End: 2019-05-03

## 2019-05-03 ENCOUNTER — TRANSCRIPTION ENCOUNTER (OUTPATIENT)
Age: 34
End: 2019-05-03

## 2019-05-03 ENCOUNTER — RESULT CHARGE (OUTPATIENT)
Age: 34
End: 2019-05-03

## 2019-05-03 VITALS
OXYGEN SATURATION: 98 % | BODY MASS INDEX: 33.11 KG/M2 | TEMPERATURE: 97.6 F | HEART RATE: 101 BPM | DIASTOLIC BLOOD PRESSURE: 87 MMHG | SYSTOLIC BLOOD PRESSURE: 134 MMHG | WEIGHT: 181 LBS

## 2019-05-03 DIAGNOSIS — Z22.330 CARRIER OF GROUP B STREPTOCOCCUS: ICD-10-CM

## 2019-05-03 DIAGNOSIS — Z86.19 PERSONAL HISTORY OF OTHER INFECTIOUS AND PARASITIC DISEASES: ICD-10-CM

## 2019-05-03 DIAGNOSIS — G56.03 CARPAL TUNNEL SYNDROM,BILATERAL UPPER LIMBS: ICD-10-CM

## 2019-05-03 PROBLEM — J45.909 UNSPECIFIED ASTHMA, UNCOMPLICATED: Chronic | Status: ACTIVE | Noted: 2019-04-26

## 2019-05-03 PROBLEM — O26.899 OTHER SPECIFIED PREGNANCY RELATED CONDITIONS, UNSPECIFIED TRIMESTER: Chronic | Status: ACTIVE | Noted: 2019-04-26

## 2019-05-03 LAB
BILIRUB UR QL STRIP: NEGATIVE
CLARITY UR: CLEAR
COLLECTION METHOD: NORMAL
FETAL HEART RATE (BPM): 131
FETAL MOVEMENT: PRESENT
GLUCOSE UR-MCNC: NEGATIVE
HCG UR QL: 0.2 EU/DL
HGB UR QL STRIP.AUTO: NEGATIVE
KETONES UR-MCNC: NEGATIVE
LEUKOCYTE ESTERASE UR QL STRIP: NORMAL
NITRITE UR QL STRIP: NEGATIVE
PH UR STRIP: 7
PROT UR STRIP-MCNC: NORMAL
SP GR UR STRIP: 1.01
URINE ALBUMIN/PROTEIN: NORMAL
URINE GLUCOSE: NEGATIVE
URINE KETONES: NEGATIVE

## 2019-05-03 PROCEDURE — 76819 FETAL BIOPHYS PROFIL W/O NST: CPT | Mod: 26

## 2019-05-03 PROCEDURE — 76816 OB US FOLLOW-UP PER FETUS: CPT | Mod: 26

## 2019-05-03 PROCEDURE — 99214 OFFICE O/P EST MOD 30 MIN: CPT | Mod: 25

## 2019-05-03 PROCEDURE — ZZZZZ: CPT

## 2019-05-03 NOTE — DISCUSSION/SUMMARY
[FreeTextEntry1] : Gen: NAD, A&OX3\par Cardio: rrr, s1/s2 normal\par Pulm: cta b/l\par Abd: gravid, nontender, no palpable contractions, no RUQ/epigastric tenderness\par VE: vulva red and edematous. Thick white discharge, vertex at station minus 2 cervix admits one finger is soft and posterior.\par Ext: 1+ pedal edema, no calf tenderness\par Neuro: 2+ UE B/L DTRs\par \par \par \par Pt is 33 G 8 P 3 0 4 3 at 38 weeks 6 days transfer from [facility] for prenatal care and management of asthma. Denies any preeclamptic symptoms today. No VB, LOF. Good FM.\par BPP 8/8\par Plan induction of labor early next week.\par

## 2019-05-06 ENCOUNTER — INPATIENT (INPATIENT)
Facility: HOSPITAL | Age: 34
LOS: 2 days | Discharge: HOME | End: 2019-05-09
Attending: OBSTETRICS & GYNECOLOGY | Admitting: OBSTETRICS & GYNECOLOGY
Payer: MEDICAID

## 2019-05-06 VITALS — SYSTOLIC BLOOD PRESSURE: 132 MMHG | DIASTOLIC BLOOD PRESSURE: 93 MMHG | HEART RATE: 100 BPM

## 2019-05-06 DIAGNOSIS — Z98.890 OTHER SPECIFIED POSTPROCEDURAL STATES: Chronic | ICD-10-CM

## 2019-05-06 LAB
ALBUMIN SERPL ELPH-MCNC: 3.1 G/DL — LOW (ref 3.5–5.2)
ALP SERPL-CCNC: 155 U/L — HIGH (ref 30–115)
ALT FLD-CCNC: 6 U/L — SIGNIFICANT CHANGE UP (ref 0–41)
AMPHET UR-MCNC: NEGATIVE — SIGNIFICANT CHANGE UP
ANION GAP SERPL CALC-SCNC: 12 MMOL/L — SIGNIFICANT CHANGE UP (ref 7–14)
APPEARANCE UR: ABNORMAL
AST SERPL-CCNC: 18 U/L — SIGNIFICANT CHANGE UP (ref 0–41)
BACTERIA # UR AUTO: ABNORMAL /HPF
BARBITURATES UR SCN-MCNC: NEGATIVE — SIGNIFICANT CHANGE UP
BASOPHILS # BLD AUTO: 0.06 K/UL — SIGNIFICANT CHANGE UP (ref 0–0.2)
BASOPHILS NFR BLD AUTO: 0.4 % — SIGNIFICANT CHANGE UP (ref 0–1)
BENZODIAZ UR-MCNC: NEGATIVE — SIGNIFICANT CHANGE UP
BILIRUB SERPL-MCNC: <0.2 MG/DL — SIGNIFICANT CHANGE UP (ref 0.2–1.2)
BILIRUB UR-MCNC: NEGATIVE — SIGNIFICANT CHANGE UP
BLD GP AB SCN SERPL QL: SIGNIFICANT CHANGE UP
BUN SERPL-MCNC: 10 MG/DL — SIGNIFICANT CHANGE UP (ref 10–20)
BUPRENORPHINE SCREEN, URINE RESULT: NEGATIVE — SIGNIFICANT CHANGE UP
CALCIUM SERPL-MCNC: 8.5 MG/DL — SIGNIFICANT CHANGE UP (ref 8.5–10.1)
CHLORIDE SERPL-SCNC: 103 MMOL/L — SIGNIFICANT CHANGE UP (ref 98–110)
CO2 SERPL-SCNC: 19 MMOL/L — SIGNIFICANT CHANGE UP (ref 17–32)
COCAINE METAB.OTHER UR-MCNC: NEGATIVE — SIGNIFICANT CHANGE UP
COLOR SPEC: YELLOW — SIGNIFICANT CHANGE UP
CREAT ?TM UR-MCNC: 88 MG/DL — SIGNIFICANT CHANGE UP
CREAT SERPL-MCNC: <0.5 MG/DL — LOW (ref 0.7–1.5)
DIFF PNL FLD: NEGATIVE — SIGNIFICANT CHANGE UP
EOSINOPHIL # BLD AUTO: 0.05 K/UL — SIGNIFICANT CHANGE UP (ref 0–0.7)
EOSINOPHIL NFR BLD AUTO: 0.3 % — SIGNIFICANT CHANGE UP (ref 0–8)
EPI CELLS # UR: ABNORMAL /HPF
GLUCOSE SERPL-MCNC: 81 MG/DL — SIGNIFICANT CHANGE UP (ref 70–99)
GLUCOSE UR QL: NEGATIVE MG/DL — SIGNIFICANT CHANGE UP
HCT VFR BLD CALC: 29 % — LOW (ref 37–47)
HGB BLD-MCNC: 9 G/DL — LOW (ref 12–16)
IMM GRANULOCYTES NFR BLD AUTO: 0.8 % — HIGH (ref 0.1–0.3)
KETONES UR-MCNC: NEGATIVE — SIGNIFICANT CHANGE UP
L&D DRUG SCREEN, URINE: SIGNIFICANT CHANGE UP
LDH SERPL L TO P-CCNC: 373 — HIGH (ref 50–242)
LEUKOCYTE ESTERASE UR-ACNC: ABNORMAL
LYMPHOCYTES # BLD AUTO: 19.6 % — LOW (ref 20.5–51.1)
LYMPHOCYTES # BLD AUTO: 2.92 K/UL — SIGNIFICANT CHANGE UP (ref 1.2–3.4)
MCHC RBC-ENTMCNC: 24.1 PG — LOW (ref 27–31)
MCHC RBC-ENTMCNC: 31 G/DL — LOW (ref 32–37)
MCV RBC AUTO: 77.5 FL — LOW (ref 81–99)
METHADONE UR-MCNC: NEGATIVE — SIGNIFICANT CHANGE UP
MONOCYTES # BLD AUTO: 0.65 K/UL — HIGH (ref 0.1–0.6)
MONOCYTES NFR BLD AUTO: 4.4 % — SIGNIFICANT CHANGE UP (ref 1.7–9.3)
NEUTROPHILS # BLD AUTO: 11.09 K/UL — HIGH (ref 1.4–6.5)
NEUTROPHILS NFR BLD AUTO: 74.5 % — SIGNIFICANT CHANGE UP (ref 42.2–75.2)
NITRITE UR-MCNC: NEGATIVE — SIGNIFICANT CHANGE UP
NRBC # BLD: 0 /100 WBCS — SIGNIFICANT CHANGE UP (ref 0–0)
OPIATES UR-MCNC: NEGATIVE — SIGNIFICANT CHANGE UP
OXYCODONE UR-MCNC: NEGATIVE — SIGNIFICANT CHANGE UP
PCP UR-MCNC: NEGATIVE — SIGNIFICANT CHANGE UP
PH UR: 7 — SIGNIFICANT CHANGE UP (ref 5–8)
PLATELET # BLD AUTO: 273 K/UL — SIGNIFICANT CHANGE UP (ref 130–400)
POTASSIUM SERPL-MCNC: 4.5 MMOL/L — SIGNIFICANT CHANGE UP (ref 3.5–5)
POTASSIUM SERPL-SCNC: 4.5 MMOL/L — SIGNIFICANT CHANGE UP (ref 3.5–5)
PRENATAL SYPHILIS TEST: SIGNIFICANT CHANGE UP
PROPOXYPHENE QUALITATIVE URINE RESULT: NEGATIVE — SIGNIFICANT CHANGE UP
PROT ?TM UR-MCNC: 41 MG/DLG/24H — SIGNIFICANT CHANGE UP
PROT SERPL-MCNC: 6 G/DL — SIGNIFICANT CHANGE UP (ref 6–8)
PROT UR-MCNC: 30 MG/DL
PROT/CREAT UR-RTO: 0.5 RATIO — HIGH (ref 0–0.2)
RBC # BLD: 3.74 M/UL — LOW (ref 4.2–5.4)
RBC # FLD: 15.4 % — HIGH (ref 11.5–14.5)
SODIUM SERPL-SCNC: 134 MMOL/L — LOW (ref 135–146)
SP GR SPEC: 1.01 — SIGNIFICANT CHANGE UP (ref 1.01–1.03)
TYPE + AB SCN PNL BLD: SIGNIFICANT CHANGE UP
URATE SERPL-MCNC: 4.8 MG/DL — SIGNIFICANT CHANGE UP (ref 2.5–7)
UROBILINOGEN FLD QL: 1 MG/DL (ref 0.2–0.2)
WBC # BLD: 14.89 K/UL — HIGH (ref 4.8–10.8)
WBC # FLD AUTO: 14.89 K/UL — HIGH (ref 4.8–10.8)
WBC UR QL: SIGNIFICANT CHANGE UP /HPF

## 2019-05-06 RX ORDER — AMPICILLIN TRIHYDRATE 250 MG
1 CAPSULE ORAL EVERY 4 HOURS
Qty: 0 | Refills: 0 | Status: DISCONTINUED | OUTPATIENT
Start: 2019-05-06 | End: 2019-05-07

## 2019-05-06 RX ORDER — AMPICILLIN TRIHYDRATE 250 MG
2 CAPSULE ORAL ONCE
Qty: 0 | Refills: 0 | Status: COMPLETED | OUTPATIENT
Start: 2019-05-06 | End: 2019-05-06

## 2019-05-06 RX ORDER — OXYTOCIN 10 UNIT/ML
VIAL (ML) INJECTION
Qty: 20 | Refills: 0 | Status: DISCONTINUED | OUTPATIENT
Start: 2019-05-06 | End: 2019-05-09

## 2019-05-06 RX ORDER — SODIUM CHLORIDE 9 MG/ML
1000 INJECTION, SOLUTION INTRAVENOUS
Qty: 0 | Refills: 0 | Status: DISCONTINUED | OUTPATIENT
Start: 2019-05-06 | End: 2019-05-07

## 2019-05-06 RX ORDER — OXYTOCIN 10 UNIT/ML
2 VIAL (ML) INJECTION
Qty: 30 | Refills: 0 | Status: DISCONTINUED | OUTPATIENT
Start: 2019-05-06 | End: 2019-05-07

## 2019-05-06 RX ADMIN — Medication 2 MILLIUNIT(S)/MIN: at 11:30

## 2019-05-06 RX ADMIN — Medication 108 GRAM(S): at 18:09

## 2019-05-06 RX ADMIN — Medication 216 GRAM(S): at 10:08

## 2019-05-06 RX ADMIN — SODIUM CHLORIDE 125 MILLILITER(S): 9 INJECTION, SOLUTION INTRAVENOUS at 10:09

## 2019-05-06 RX ADMIN — Medication 108 GRAM(S): at 14:09

## 2019-05-06 RX ADMIN — Medication 108 GRAM(S): at 22:10

## 2019-05-06 NOTE — OB PROVIDER H&P - NSHPPHYSICALEXAM_GEN_ALL_CORE
Vital Signs Last 24 Hrs  T(C): 37 (06 May 2019 09:15), Max: 37 (06 May 2019 09:15)  T(F): 98.6 (06 May 2019 09:15), Max: 98.6 (06 May 2019 09:15)  HR: 93 (06 May 2019 09:37) (93 - 100)  BP: 136/86 (06 May 2019 09:37) (132/93 - 136/86)  RR: 16 (06 May 2019 09:26) (16 - 16)    Udip: trace protein  EFM: 130/mod/accel+  [place]: irregular, q2-5min Vital Signs Last 24 Hrs  T(C): 37 (06 May 2019 09:15), Max: 37 (06 May 2019 09:15)  T(F): 98.6 (06 May 2019 09:15), Max: 98.6 (06 May 2019 09:15)  HR: 93 (06 May 2019 09:37) (93 - 100)  BP: 136/86 (06 May 2019 09:37) (132/93 - 136/86)  RR: 16 (06 May 2019 09:26) (16 - 16)    Udip: trace protein  EFM: 130/mod/accel+  [place]: irregular, q2-5min    Gen: NAD  CVS: RRR, normal S1. S2  Lungs: CTAB  Abd: soft, non tender, no palpable contractions no R/G/R, no RUQ/epigastric tenderness  SVE 2/50/-2  LE: minimal bilateral edema, no tenderness    Vertex by sono.

## 2019-05-06 NOTE — OB PROVIDER H&P - NSHPSOCIALHISTORY_GEN_ALL_CORE
h/o 1PPD smoking for __, in the beginning of the pregnancy cutting down to 4 cig/day, then cut down to 1 cig/day h/o 1PPD smoking in the beginning of the pregnancy cutting down to 4 cig/day, then cut down to 1 cig/day, no longer smoking.

## 2019-05-06 NOTE — CHART NOTE - NSCHARTNOTEFT_GEN_A_CORE
Monday 05/06/2019  22:25    OB Resident called for an Epidural top-off.  Resident reminded of the Fetal deceleration with last Epidural top-off.    Epidural neg asp for CSF/Heme.  Epidural top-off:  -  0.5% Bupivacaine Plain, 3 ml  -  0.25% Bupivacaine Plain, 2 ml  -  Fentanyl 100 mcg x 1    Will remain immediately available.

## 2019-05-06 NOTE — OB PROVIDER H&P - ALERT: PERTINENT HISTORY
1st Trimester Sonogram 1st Trimester Sonogram/20 Week Level II Sonogram/Follow up Sonogram for Growth

## 2019-05-06 NOTE — OB PROVIDER H&P - HISTORY OF PRESENT ILLNESS
34 yo  39w2d w/ KASEY of 2019 by LMP here for scheduled IOL for 32 yo  at 39w2d w/ KASEY of 2019 by LMP consistent with first trimester sono here for scheduled elective IOL. Reports good FM, no LOF, Vb or contractions. Follows with [facility] for Asthma, uses rescue albuterol inhaler once a month, denies wheezing or shortness of breath. Was taking budesonide and complaining of uncontrolled symptoms earlier in pregnancy, however recently stopped as she was asymptomatic. Did not follow with pulmonology/medicine as recommended. Non compliant with prenatal care visits (did not see low risk OB since ). Has had 30 protein, 2/3+ protein during visits since 2018. In 2019 had complaints of swelling with proteinuria on udip, sent for BP monitoring however no criteria met. Hx of elevated BPs in this pregnancy, prior pregnancies or between pregnancies. Today, denies headaches,, vision changes, CP, SOB, RUQ/epigastric or increased swelling today. GBS pos. Last examined last week, 1 cm.

## 2019-05-06 NOTE — OB PROVIDER H&P - NS_OBGYNHISTORY_OBGYN_ALL_OB_FT
OB Hx: FT  x3, largest 7 lbs, first baby has sickle cell trait (came from the father, which is different FOB than the other babies)   GYN Hx: denies h/o fibroids, ovarian cysts and STIs OB Hx: FT  x3, largest 7 lbs, first baby has sickle cell trait (came from the father, which is different FOB than the other babies). etopx4, 19w termination with laminaria x1, D&Cx4 (first trimester)  GYN Hx: denies h/o fibroids, ovarian cysts and STIs

## 2019-05-06 NOTE — OB PROVIDER H&P - NSHPLABSRESULTS_GEN_ALL_CORE
Consistent proteinuria  Declined sequential II, sequential I low risk    Sonos:  34w4d: EFW 2462 grams (46%), cephalic, posterior placenta, MVP 53 mm, BPP 8/8,  bpm Consistent proteinuria  Declined sequential II, sequential I low risk    Sonos:  38w6d: EFW 3871g (83%), cephalic,, fundal placenta, MVP 36, BPP 8/8  37w6d: Cephalic, post placenta, MVP 50, BPP 8/8  34w4d: EFW 2462 grams (46%), cephalic, posterior placenta, MVP 53 mm, BPP 8/8,  bpm  19w5d: EFW 360g, cephalic, 3VC, post placenta, MVP 50, normal anatomy  12.5w: NT  9w3d - Dating and viability

## 2019-05-06 NOTE — OB PROVIDER IHI INDUCTION/AUGMENTATION NOTE - NS_CHECKALL_OBGYN_ALL_OB
FHR was reviewed/Induction / Augmentation was discussed/Contractions pattern was reviewed/H&P was completed/Order was written

## 2019-05-06 NOTE — PROGRESS NOTE ADULT - SUBJECTIVE AND OBJECTIVE BOX
PGY2 Note    Patient seen at bedside for evaluation of labor progression, complaining of painful contractions, otherwise no complaints.     T(F): 98.78 ( @ 13:20), Max: 98.8 ( @ 09:28)  HR: 93 ( @ 15:15)  BP: 121/70 ( @ 15:15) (120/98 - 141/76)  RR: 16 ( @ 13:20)  EFM: 135/mod/+accels  TOCO: q2-3 min  SVE: 3/50/-2, AROM, small, clear    Medications:  ampicillin  IVPB: 216 ( @ 10:08)  ampicillin  IVPB: 108 ( @ 14:09)  lactated ringers.: 125 ( @ 09:40)  oxytocin Infusion: started at 1135, now at 14mu/min      Labs:                        9.0    14.89 )-----------( 273      ( 06 May 2019 09:45 )             29.0         134<L>  |  103  |  10  ----------------------------<  81  4.5   |  19  |  <0.5<L>    Ca    8.5      06 May 2019 09:45    TPro  6.0  /  Alb  3.1<L>  /  TBili  <0.2  /  DBili  x   /  AST  18  /  ALT  6   /  AlkPhos  155<H>      Uric Acid, Serum: 4.8 mg/dL ( @ 09:45)    Urinalysis Basic - ( 06 May 2019 09:45 )    Color: Yellow / Appearance: Cloudy / S.015 / pH: x  Gluc: x / Ketone: Negative  / Bili: Negative / Urobili: 1.0 mg/dL   Blood: x / Protein: 30 mg/dL / Nitrite: Negative   Leuk Esterase: Small / RBC: x / WBC 3-5 /HPF   Sq Epi: x / Non Sq Epi: Few /HPF / Bacteria: Few /HPF      Uric acid 4.8  O pos  UDS neg  utpcr 0.5 PGY2 Note    Patient seen at bedside for evaluation of labor progression, complaining of painful contractions, otherwise no complaints. Continues to have elevated, non severe blood pressures, now preeclamptic without severe features. Denies headache, vision changes, CP, SOB, RUQ/epigastric pain or increase swelling.     T(F): 98.78 ( @ 13:20), Max: 98.8 ( @ 09:28)  HR: 93 ( @ 15:15)  BP: 121/70 ( @ 15:15) (120/98 - 141/76)  RR: 16 ( @ 13:20)  EFM: 135/mod/+accels  TOCO: q2-3 min  SVE: 3/50/-2, AROM, small, clear    Medications:  ampicillin  IVPB: 216 ( @ 10:08)  ampicillin  IVPB: 108 ( @ 14:09)  lactated ringers.: 125 ( @ 09:40)  oxytocin Infusion: started at 1135, now at 14mu/min      Labs:                        9.0    14.89 )-----------( 273      ( 06 May 2019 09:45 )             29.0         134<L>  |  103  |  10  ----------------------------<  81  4.5   |  19  |  <0.5<L>    Ca    8.5      06 May 2019 09:45    TPro  6.0  /  Alb  3.1<L>  /  TBili  <0.2  /  DBili  x   /  AST  18  /  ALT  6   /  AlkPhos  155<H>      Uric Acid, Serum: 4.8 mg/dL ( @ 09:45)    Urinalysis Basic - ( 06 May 2019 09:45 )    Color: Yellow / Appearance: Cloudy / S.015 / pH: x  Gluc: x / Ketone: Negative  / Bili: Negative / Urobili: 1.0 mg/dL   Blood: x / Protein: 30 mg/dL / Nitrite: Negative   Leuk Esterase: Small / RBC: x / WBC 3-5 /HPF   Sq Epi: x / Non Sq Epi: Few /HPF / Bacteria: Few /HPF      Uric acid 4.8  O pos  UDS neg  utpcr 0.5

## 2019-05-06 NOTE — CHART NOTE - NSCHARTNOTEFT_GEN_A_CORE
Monday 05/06/2019  23:04    OB Resident called for pt with low BP.  Pat with SBP in high 70s  No c/o Nausea,   No c/o dizzy  Phenylephrine 50 mcg IVP x 1.    Current pain score = "0".

## 2019-05-06 NOTE — OB PROVIDER H&P - ASSESSMENT
32 yo  39w2d, GBS pos, consistent proteinuria throughout pregnancy, followed by [facility] for uncontrolled asthma, IOL for     -Admit  -Admission labs  -IVF  -Pain management prn  -Monitor VS per routine  -Continuous EFM/toco  -Ampicillin for GBS ppx    Dr. Musa and Dr. Foley and Dr. Crockett to be made aware. 32 yo  39w2d, GBS pos, hx of proteinuria throughout pregnancy, followed by [facility] for uncontrolled asthma, elevated BPs in triage, r/o GHTN vs preeclampsia, asymptomatic at this time, non compliant with prenatal care, for IOL.    -Admit  -Admission and preeclamptic  -IVF  -Pain management prn  -BPs q15min  -Continuous EFM/toco  -Ampicillin for GBS ppx  -Pitocin for IOL  -Will need  PP    Dr. Musa and Dr. Crockett aware.

## 2019-05-07 LAB
HCT VFR BLD CALC: 26.2 % — LOW (ref 37–47)
HGB BLD-MCNC: 8.2 G/DL — LOW (ref 12–16)
MCHC RBC-ENTMCNC: 24.6 PG — LOW (ref 27–31)
MCHC RBC-ENTMCNC: 31.3 G/DL — LOW (ref 32–37)
MCV RBC AUTO: 78.4 FL — LOW (ref 81–99)
MEV IGG SER-ACNC: 15.3 AU/ML — SIGNIFICANT CHANGE UP
MEV IGG+IGM SER-IMP: NEGATIVE — SIGNIFICANT CHANGE UP
NRBC # BLD: 0 /100 WBCS — SIGNIFICANT CHANGE UP (ref 0–0)
PLATELET # BLD AUTO: 260 K/UL — SIGNIFICANT CHANGE UP (ref 130–400)
RBC # BLD: 3.34 M/UL — LOW (ref 4.2–5.4)
RBC # FLD: 15.5 % — HIGH (ref 11.5–14.5)
WBC # BLD: 18.69 K/UL — HIGH (ref 4.8–10.8)
WBC # FLD AUTO: 18.69 K/UL — HIGH (ref 4.8–10.8)

## 2019-05-07 PROCEDURE — 59409 OBSTETRICAL CARE: CPT | Mod: U9

## 2019-05-07 RX ORDER — SIMETHICONE 80 MG/1
80 TABLET, CHEWABLE ORAL EVERY 4 HOURS
Qty: 0 | Refills: 0 | Status: DISCONTINUED | OUTPATIENT
Start: 2019-05-07 | End: 2019-05-09

## 2019-05-07 RX ORDER — IBUPROFEN 200 MG
600 TABLET ORAL EVERY 6 HOURS
Qty: 0 | Refills: 0 | Status: DISCONTINUED | OUTPATIENT
Start: 2019-05-07 | End: 2019-05-09

## 2019-05-07 RX ORDER — OXYTOCIN 10 UNIT/ML
333.33 VIAL (ML) INJECTION
Qty: 20 | Refills: 0 | Status: DISCONTINUED | OUTPATIENT
Start: 2019-05-07 | End: 2019-05-09

## 2019-05-07 RX ORDER — LANOLIN
1 OINTMENT (GRAM) TOPICAL EVERY 6 HOURS
Qty: 0 | Refills: 0 | Status: DISCONTINUED | OUTPATIENT
Start: 2019-05-07 | End: 2019-05-09

## 2019-05-07 RX ORDER — SODIUM CHLORIDE 9 MG/ML
3 INJECTION INTRAMUSCULAR; INTRAVENOUS; SUBCUTANEOUS EVERY 8 HOURS
Qty: 0 | Refills: 0 | Status: DISCONTINUED | OUTPATIENT
Start: 2019-05-07 | End: 2019-05-09

## 2019-05-07 RX ORDER — OXYCODONE HYDROCHLORIDE 5 MG/1
5 TABLET ORAL ONCE
Qty: 0 | Refills: 0 | Status: DISCONTINUED | OUTPATIENT
Start: 2019-05-07 | End: 2019-05-09

## 2019-05-07 RX ORDER — IBUPROFEN 200 MG
600 TABLET ORAL EVERY 6 HOURS
Qty: 0 | Refills: 0 | Status: COMPLETED | OUTPATIENT
Start: 2019-05-07 | End: 2020-04-04

## 2019-05-07 RX ORDER — BENZOCAINE 10 %
1 GEL (GRAM) MUCOUS MEMBRANE EVERY 6 HOURS
Qty: 0 | Refills: 0 | Status: DISCONTINUED | OUTPATIENT
Start: 2019-05-07 | End: 2019-05-09

## 2019-05-07 RX ORDER — PRAMOXINE HYDROCHLORIDE 150 MG/15G
1 AEROSOL, FOAM RECTAL EVERY 4 HOURS
Qty: 0 | Refills: 0 | Status: DISCONTINUED | OUTPATIENT
Start: 2019-05-07 | End: 2019-05-09

## 2019-05-07 RX ORDER — ACETAMINOPHEN 500 MG
975 TABLET ORAL EVERY 6 HOURS
Qty: 0 | Refills: 0 | Status: COMPLETED | OUTPATIENT
Start: 2019-05-07 | End: 2020-04-04

## 2019-05-07 RX ORDER — DIPHENHYDRAMINE HCL 50 MG
25 CAPSULE ORAL EVERY 6 HOURS
Qty: 0 | Refills: 0 | Status: DISCONTINUED | OUTPATIENT
Start: 2019-05-07 | End: 2019-05-09

## 2019-05-07 RX ORDER — GLYCERIN ADULT
1 SUPPOSITORY, RECTAL RECTAL AT BEDTIME
Qty: 0 | Refills: 0 | Status: DISCONTINUED | OUTPATIENT
Start: 2019-05-07 | End: 2019-05-09

## 2019-05-07 RX ORDER — OXYCODONE HYDROCHLORIDE 5 MG/1
5 TABLET ORAL
Qty: 0 | Refills: 0 | Status: DISCONTINUED | OUTPATIENT
Start: 2019-05-07 | End: 2019-05-09

## 2019-05-07 RX ORDER — MAGNESIUM HYDROXIDE 400 MG/1
30 TABLET, CHEWABLE ORAL
Qty: 0 | Refills: 0 | Status: DISCONTINUED | OUTPATIENT
Start: 2019-05-07 | End: 2019-05-09

## 2019-05-07 RX ORDER — DOCUSATE SODIUM 100 MG
100 CAPSULE ORAL
Qty: 0 | Refills: 0 | Status: DISCONTINUED | OUTPATIENT
Start: 2019-05-07 | End: 2019-05-09

## 2019-05-07 RX ORDER — KETOROLAC TROMETHAMINE 30 MG/ML
30 SYRINGE (ML) INJECTION ONCE
Qty: 0 | Refills: 0 | Status: DISCONTINUED | OUTPATIENT
Start: 2019-05-07 | End: 2019-05-09

## 2019-05-07 RX ORDER — ACETAMINOPHEN 500 MG
975 TABLET ORAL EVERY 6 HOURS
Qty: 0 | Refills: 0 | Status: DISCONTINUED | OUTPATIENT
Start: 2019-05-07 | End: 2019-05-09

## 2019-05-07 RX ADMIN — Medication 600 MILLIGRAM(S): at 23:04

## 2019-05-07 RX ADMIN — Medication 600 MILLIGRAM(S): at 18:43

## 2019-05-07 RX ADMIN — Medication 975 MILLIGRAM(S): at 18:41

## 2019-05-07 RX ADMIN — Medication 975 MILLIGRAM(S): at 08:20

## 2019-05-07 RX ADMIN — SODIUM CHLORIDE 3 MILLILITER(S): 9 INJECTION INTRAMUSCULAR; INTRAVENOUS; SUBCUTANEOUS at 15:59

## 2019-05-07 RX ADMIN — SODIUM CHLORIDE 3 MILLILITER(S): 9 INJECTION INTRAMUSCULAR; INTRAVENOUS; SUBCUTANEOUS at 23:03

## 2019-05-07 RX ADMIN — Medication 975 MILLIGRAM(S): at 20:57

## 2019-05-07 RX ADMIN — Medication 108 GRAM(S): at 02:36

## 2019-05-07 RX ADMIN — Medication 600 MILLIGRAM(S): at 13:41

## 2019-05-07 RX ADMIN — Medication 975 MILLIGRAM(S): at 08:52

## 2019-05-07 RX ADMIN — Medication 600 MILLIGRAM(S): at 12:49

## 2019-05-07 RX ADMIN — Medication 975 MILLIGRAM(S): at 15:58

## 2019-05-07 RX ADMIN — Medication 1 TABLET(S): at 12:50

## 2019-05-07 NOTE — OB PROVIDER DELIVERY SUMMARY - NSPROVIDERDELIVERYNOTE_OBGYN_ALL_OB_FT
Patient was fully dilated and pushing. Head delivered OA, restituted to JODY, Anterior shoulder delivered, followed by the posterior shoulder, rest of the body without complications. Baby suctioned, infant placed on mothers abdomen and cord clamped and cut. Cord segment and blood obtained. Placenta delivered, intact. Fundus massaged and firm, with good hemostasis. Pitocin given postpartum. Vaginal vault, perineum, and cervix inspected, and no lacerations were noted. Live female infant delivered. APGARs 9/9.     Dr. Mathews and Dr. Geller present

## 2019-05-07 NOTE — PROGRESS NOTE ADULT - SUBJECTIVE AND OBJECTIVE BOX
[facility] PGY2 Note:     Patient seen and examined at bedside. C/o rectal pressure and urge to push     T(C): 36.8 (05-07-19 @ 00:03), Max: 37.1 (05-06-19 @ 09:28)  HR: 94 (05-07-19 @ 00:09) (69 - 120)  BP: 100/56 (05-07-19 @ 00:09) (76/40 - 155/82)  RR: 18 (05-07-19 @ 00:03) (16 - 18)    EFM: 140/mod/accels+/occ variables  [place]: q2-4min  SVE: 8/100/0     Meds: ampicillin  IVPB 1 Gram(s) IV Intermittent every 4 hours- 1st dose at 1010   lactated ringers. 1000 milliLiter(s) IV Continuous <Continuous>  oxytocin Infusion  milliUNIT(s)/Min IV Continuous <Continuous>  oxytocin Infusion 2 milliUNIT(s)/Min IV Continuous <Continuous> started at 1135, d/nyasia at 1848, restarted at 1922, d/nyasia at 2340       Labs:             Upr/cr ratio 0.5   UDS neg   rubeola pending

## 2019-05-08 DIAGNOSIS — O12.13 GESTATIONAL PROTEINURIA, THIRD TRIMESTER: ICD-10-CM

## 2019-05-08 DIAGNOSIS — J45.909 UNSPECIFIED ASTHMA, UNCOMPLICATED: ICD-10-CM

## 2019-05-08 DIAGNOSIS — O09.93 SUPERVISION OF HIGH RISK PREGNANCY, UNSPECIFIED, THIRD TRIMESTER: ICD-10-CM

## 2019-05-08 DIAGNOSIS — G56.03 CARPAL TUNNEL SYNDROME, BILATERAL UPPER LIMBS: ICD-10-CM

## 2019-05-08 DIAGNOSIS — Z22.330 CARRIER OF GROUP B STREPTOCOCCUS: ICD-10-CM

## 2019-05-08 PROCEDURE — 99231 SBSQ HOSP IP/OBS SF/LOW 25: CPT

## 2019-05-08 RX ORDER — ZOLPIDEM TARTRATE 10 MG/1
5 TABLET ORAL ONCE
Refills: 0 | Status: DISCONTINUED | OUTPATIENT
Start: 2019-05-08 | End: 2019-05-09

## 2019-05-08 RX ADMIN — Medication 975 MILLIGRAM(S): at 02:50

## 2019-05-08 RX ADMIN — Medication 975 MILLIGRAM(S): at 23:05

## 2019-05-08 RX ADMIN — SODIUM CHLORIDE 3 MILLILITER(S): 9 INJECTION INTRAMUSCULAR; INTRAVENOUS; SUBCUTANEOUS at 05:44

## 2019-05-08 RX ADMIN — Medication 975 MILLIGRAM(S): at 18:45

## 2019-05-08 RX ADMIN — Medication 975 MILLIGRAM(S): at 17:59

## 2019-05-08 RX ADMIN — Medication 1 TABLET(S): at 11:31

## 2019-05-08 RX ADMIN — Medication 975 MILLIGRAM(S): at 11:27

## 2019-05-08 RX ADMIN — Medication 600 MILLIGRAM(S): at 20:14

## 2019-05-08 RX ADMIN — Medication 600 MILLIGRAM(S): at 05:45

## 2019-05-08 NOTE — PROGRESS NOTE ADULT - SUBJECTIVE AND OBJECTIVE BOX
PGY 1 Post Partum note    Subjective: Pt seen and examined at bedside. Doing well, pain controlled. Pt is voiding without difficulty, passing flatus and had BM, ambulating, tolerating regular diet, bottle feeding. Denies HA, blurry vision, CP, SOB, N/V, RUQ/epigastric pain, LE pain.    Physical exam:    Vital Signs Last 24 Hrs  T(F): 97.3 (08 May 2019 03:54), Max: 98.5 (07 May 2019 07:30)  HR: 77 (08 May 2019 03:54) (77 - 98)  BP: 116/64 (08 May 2019 03:54) (114/80 - 140/73)  RR: 18 (08 May 2019 03:54) (18 - 20)    Gen: NAD  CVS: s1s2, rrr  Lungs: ctab, no r/r/w  Abdomen: Soft, appropriately tender, no distension , firm uterine fundus below umbilicus, +BS, no RUQ/epigastric pain  Pelvic: Normal lochia rubra noted  Ext: No calf tenderness    Diet: Regular  meds:   acetaminophen   Tablet ..   975 milliGRAM(s) Oral (05-08-19 @ 02:50)   975 milliGRAM(s) Oral (05-07-19 @ 20:57)   975 milliGRAM(s) Oral (05-07-19 @ 15:58)   975 milliGRAM(s) Oral (05-07-19 @ 08:20)    ibuprofen  Tablet.   600 milliGRAM(s) Oral (05-08-19 @ 05:45)   600 milliGRAM(s) Oral (05-07-19 @ 23:04)   600 milliGRAM(s) Oral (05-07-19 @ 18:43)   600 milliGRAM(s) Oral (05-07-19 @ 12:49)    LABS:                        8.2    18.69 )-----------( 260      ( 07 May 2019 17:29 )             26.2                         9.0    14.89 )-----------( 273      ( 06 May 2019 09:45 )             29.0       05-06-19 @ 09:45      134<L>  |  103  |  10  ----------------------------<  81  4.5   |  19  |  <0.5<L>        Ca    8.5      06 May 2019 09:45    TPro  6.0  /  Alb  3.1<L>  /  TBili  <0.2  /  DBili  x   /  AST  18  /  ALT  6   /  AlkPhos  155<H>  05-06-19 @ 09:45 PGY 1 Post Partum note    Subjective: Pt seen and examined at bedside. Doing well, pain controlled. Pt is voiding without difficulty, passing flatus and had BM, ambulating, tolerating regular diet, bottle feeding. Denies HA, blurry vision, CP, SOB, N/V, RUQ/epigastric pain, LE pain.    Physical exam:    Vital Signs Last 24 Hrs  T(F): 97.3 (08 May 2019 03:54), Max: 98.5 (07 May 2019 07:30)  HR: 77 (08 May 2019 03:54) (77 - 98)  BP: 116/64 (08 May 2019 03:54) (114/80 - 140/73)  RR: 18 (08 May 2019 03:54) (18 - 20)    Gen: NAD  CVS: s1s2, rrr  Lungs: ctab, no r/r/w  Abdomen: Soft, appropriately tender, no distension , firm uterine fundus below umbilicus, +BS, no RUQ/epigastric tenderness  Pelvic: Normal lochia rubra noted  Ext: No calf tenderness    Diet: Regular  meds:   acetaminophen   Tablet ..   975 milliGRAM(s) Oral (05-08-19 @ 02:50)   975 milliGRAM(s) Oral (05-07-19 @ 20:57)   975 milliGRAM(s) Oral (05-07-19 @ 15:58)   975 milliGRAM(s) Oral (05-07-19 @ 08:20)    ibuprofen  Tablet.   600 milliGRAM(s) Oral (05-08-19 @ 05:45)   600 milliGRAM(s) Oral (05-07-19 @ 23:04)   600 milliGRAM(s) Oral (05-07-19 @ 18:43)   600 milliGRAM(s) Oral (05-07-19 @ 12:49)    LABS:                        8.2    18.69 )-----------( 260      ( 07 May 2019 17:29 )             26.2                         9.0    14.89 )-----------( 273      ( 06 May 2019 09:45 )             29.0       05-06-19 @ 09:45      134<L>  |  103  |  10  ----------------------------<  81  4.5   |  19  |  <0.5<L>        Ca    8.5      06 May 2019 09:45    TPro  6.0  /  Alb  3.1<L>  /  TBili  <0.2  /  DBili  x   /  AST  18  /  ALT  6   /  AlkPhos  155<H>  05-06-19 @ 09:45

## 2019-05-09 ENCOUNTER — TRANSCRIPTION ENCOUNTER (OUTPATIENT)
Age: 34
End: 2019-05-09

## 2019-05-09 VITALS
DIASTOLIC BLOOD PRESSURE: 71 MMHG | TEMPERATURE: 96 F | RESPIRATION RATE: 18 BRPM | HEART RATE: 75 BPM | SYSTOLIC BLOOD PRESSURE: 129 MMHG

## 2019-05-09 PROCEDURE — 99238 HOSP IP/OBS DSCHRG MGMT 30/<: CPT

## 2019-05-09 RX ORDER — ACETAMINOPHEN 500 MG
3 TABLET ORAL
Qty: 0 | Refills: 0 | DISCHARGE
Start: 2019-05-09

## 2019-05-09 RX ORDER — IBUPROFEN 200 MG
1 TABLET ORAL
Qty: 0 | Refills: 0 | DISCHARGE
Start: 2019-05-09

## 2019-05-09 RX ORDER — DOCUSATE SODIUM 100 MG
1 CAPSULE ORAL
Qty: 60 | Refills: 3
Start: 2019-05-09

## 2019-05-09 RX ORDER — DOCUSATE SODIUM 100 MG
1 CAPSULE ORAL
Qty: 0 | Refills: 0 | DISCHARGE
Start: 2019-05-09

## 2019-05-09 RX ORDER — ALBUTEROL 90 UG/1
0 AEROSOL, METERED ORAL
Qty: 0 | Refills: 0 | DISCHARGE

## 2019-05-09 RX ORDER — FERROUS SULFATE 325(65) MG
1 TABLET ORAL
Qty: 60 | Refills: 3
Start: 2019-05-09

## 2019-05-09 RX ADMIN — Medication 1 TABLET(S): at 11:31

## 2019-05-09 RX ADMIN — Medication 0.5 MILLILITER(S): at 11:46

## 2019-05-09 RX ADMIN — Medication 975 MILLIGRAM(S): at 11:32

## 2019-05-09 RX ADMIN — Medication 600 MILLIGRAM(S): at 08:18

## 2019-05-09 RX ADMIN — Medication 600 MILLIGRAM(S): at 03:42

## 2019-05-09 NOTE — DISCHARGE NOTE OB - HOSPITAL COURSE
19 @ 06:16    HPI:  32 yo  at 39w2d w/ KASEY of 2019 by LMP consistent with first trimester sono here for scheduled elective IOL. Reports good FM, no LOF, Vb or contractions. Follows with [facility] for Asthma, uses rescue albuterol inhaler once a month, denies wheezing or shortness of breath. Was taking budesonide and complaining of uncontrolled symptoms earlier in pregnancy, however recently stopped as she was asymptomatic. Did not follow with pulmonology/medicine as recommended. Non compliant with prenatal care visits (did not see low risk OB since ). Has had 30 protein, 2/3+ protein during visits since 2018. In 2019 had complaints of swelling with proteinuria on udip, sent for BP monitoring however no criteria met. Hx of elevated BPs in this pregnancy, prior pregnancies or between pregnancies. Today, denies headaches,, vision changes, CP, SOB, RUQ/epigastric or increased swelling today. GBS pos. Last examined last week, 1 cm. (06 May 2019 09:55)      PAST MEDICAL & SURGICAL HISTORY:  Carpal tunnel syndrome during pregnancy  Asthma  H/O dilation and curettage      POST PARTUM COURSE:   32 yo now , s/p uncomplicated , h/o asthma, preeclampsia w/o severe features, BPs in nonsevere range postpartum, cleared by SW, w/ asymptomatic anemia, measles non immune, doing well, discharged PPD2       LABS:                        8.2    18.69 )-----------( 260      ( 07 May 2019 17:29 )             26.2       19 @ 09:45      134<L>  |  103  |  10  ----------------------------<  81  4.5   |  19  |  <0.5<L>        Ca    8.5      06 May 2019 09:45    TPro  6.0  /  Alb  3.1<L>  /  TBili  <0.2  /  DBili  x   /  AST  18  /  ALT  6   /  AlkPhos  155<H>  19 @ 09:45        Allergies    No Known Allergies    Intolerances

## 2019-05-09 NOTE — DISCHARGE NOTE OB - MEDICATION SUMMARY - MEDICATIONS TO TAKE
I will START or STAY ON the medications listed below when I get home from the hospital:    acetaminophen 325 mg oral tablet  -- 3 tab(s) by mouth every 6 hours  -- Indication: For pain    ibuprofen 600 mg oral tablet  -- 1 tab(s) by mouth every 6 hours  -- Indication: For pain    ferrous sulfate 325 mg (65 mg elemental iron) oral tablet  -- 1 tab(s) by mouth 2 times a day   -- Check with your doctor before becoming pregnant.  Do not chew, break, or crush.  May discolor urine or feces.    -- Indication: For anemia    docusate sodium 100 mg oral capsule  -- 1 cap(s) by mouth 2 times a day, As needed, For stool softening  -- Indication: For constipation

## 2019-05-09 NOTE — DISCHARGE NOTE OB - ADDITIONAL INSTRUCTIONS
Please follow up in the office in 1 week for blood pressure check and 6 weeks for your postpartum visit. A visiting nurse will come to your house this week to take your blood pressure.   A prescription for iron has been sent to your pharmacy for anemia, please take twice daily. Stool softener has been sent as well for constipation.

## 2019-05-09 NOTE — DISCHARGE NOTE OB - CARE PROVIDERS DIRECT ADDRESSES
[email or web address]

## 2019-05-09 NOTE — DISCHARGE NOTE OB - PATIENT PORTAL LINK FT
You can access the [facility] Portal, offered by [facility], by registering with the following website: [email or web address]

## 2019-05-09 NOTE — DISCHARGE NOTE OB - MATERIALS PROVIDED
Guide to Postpartum Care/Vaccinations/Tdap Vaccination (VIS Pub Date: January 24, 2012)/MMR Vaccination (VIS Pub Date: April 20, 2012)

## 2019-05-09 NOTE — PROGRESS NOTE ADULT - SUBJECTIVE AND OBJECTIVE BOX
PGY 1 Post Partum note    Subjective: Pt seen and examined at bedside. Doing well, pain controlled. Pt is voiding without difficulty, passing flatus and had BM, tolerating regular diet, ambulating, bottle feeding. Denies HA, blurry vision, CP, SOB, N/V, RUQ/epigastric pain, LE pain.    Physical exam:    Vital Signs Last 24 Hrs  T(F): 96 (09 May 2019 03:24), Max: 97.7 (08 May 2019 11:00)  HR: 82 (09 May 2019 03:24) (78 - 94)  BP: 142/75 (09 May 2019 03:24) (123/67 - 147/86)  RR: 18 (09 May 2019 03:24) (18 - 20)    Gen: NAD  CVS: s1s2, rrr  Lungs: ctab, no r/r/w  Abdomen: Soft, appropriately tender, no distension , firm uterine fundus below umbilicus, no RUQ/epigastric tenderness  Pelvic: Minimal lochia rubra noted  Ext: No calf tenderness    Diet: Regular  meds:   acetaminophen   Tablet ..   975 milliGRAM(s) Oral (05-08-19 @ 23:05)   975 milliGRAM(s) Oral (05-08-19 @ 17:59)   975 milliGRAM(s) Oral (05-08-19 @ 11:27)    ibuprofen  Tablet.   600 milliGRAM(s) Oral (05-09-19 @ 03:42)   600 milliGRAM(s) Oral (05-08-19 @ 20:14)    prenatal multivitamin   1 Tablet(s) Oral (05-08-19 @ 11:31)        LABS:                        8.2    18.69 )-----------( 260      ( 07 May 2019 17:29 )             26.2       05-06-19 @ 09:45      134<L>  |  103  |  10  ----------------------------<  81  4.5   |  19  |  <0.5<L>        Ca    8.5      06 May 2019 09:45    TPro  6.0  /  Alb  3.1<L>  /  TBili  <0.2  /  DBili  x   /  AST  18  /  ALT  6   /  AlkPhos  155<H>  05-06-19 @ 09:45

## 2019-05-09 NOTE — PROGRESS NOTE ADULT - ASSESSMENT
32 yo now , s/p , PPD2, h/o asthma, preeclampsia w/o severe features, BPs in nonsevere range postpartum, cleared by SW, w/ asymptomatic anemia, measles non immune, doing well.    - pain management PRN  - c/w regular diet, PO hydration  - monitor vitals q4hrs  - encourage ambulation  - MMR ordered  - d/c today    Dr. Pierson aware and Dr. Rodriguez to be made aware.
32 yo  39w2d, GBS pos, hx of proteinuria throughout pregnancy, followed by [facility] for uncontrolled asthma, elevated BPs in triage, preeclampsia without severe features, asymptomatic at this time, non compliant with prenatal care, IOL on pitocin, s/p AROM.  -c/w ampicillin/pitocin  -Epidural for pain mgt  -BPs q15min  -Continuous EFM/toco  -Will need SW PP    Dr. Musa and Dr. Crockett aware.
34 yo now , s/p , PPD1, h/o asthma, preeclampsia w/o severe features, BPs now controlled postpartum, cleared by SW, measles non immune, doing well.    - pain management PRN  - c/w regular diet, PO hydration  - monitor vitals q4hrs  - encourage ambulation  - MMR ordered  - anticipate d/c tomorrow    Dr. Pierson aware and Dr. Fine to be made aware.
A/P: 32yo  at 39w3d GA, GBS pos, IOL for favorable cervix at term, with uncontrolled asthma, now with preeclampsia without severe features, in active labor   - f/u pending labs  - continuous EFM/toco  - clear liquids  - monitor BPs   - IV hydration  - oxygen by face mask   - anticipate    - amp q4h for GBS prophylaxis    Dr. Mathews and Dr. ritter aware.

## 2019-05-09 NOTE — DISCHARGE NOTE OB - CARE PROVIDER_API CALL
Facundo Foley)  MaternalFetal Medicine; Obstetrics and Gynecology  [address] [place] [address]  Phone: [phone]  Fax: [phone]  Follow Up Time:

## 2019-05-13 DIAGNOSIS — J45.909 UNSPECIFIED ASTHMA, UNCOMPLICATED: ICD-10-CM

## 2019-05-13 DIAGNOSIS — Z3A.39 39 WEEKS GESTATION OF PREGNANCY: ICD-10-CM

## 2019-05-20 ENCOUNTER — OUTPATIENT (OUTPATIENT)
Dept: OUTPATIENT SERVICES | Facility: HOSPITAL | Age: 34
LOS: 1 days | Discharge: HOME | End: 2019-05-20

## 2019-05-20 ENCOUNTER — APPOINTMENT (OUTPATIENT)
Dept: ANTEPARTUM | Facility: CLINIC | Age: 34
End: 2019-05-20
Payer: MEDICAID

## 2019-05-20 VITALS
OXYGEN SATURATION: 97 % | DIASTOLIC BLOOD PRESSURE: 88 MMHG | WEIGHT: 168 LBS | SYSTOLIC BLOOD PRESSURE: 130 MMHG | TEMPERATURE: 98.5 F | BODY MASS INDEX: 30.73 KG/M2 | HEART RATE: 105 BPM

## 2019-05-20 DIAGNOSIS — Z98.890 OTHER SPECIFIED POSTPROCEDURAL STATES: Chronic | ICD-10-CM

## 2019-05-20 PROCEDURE — 99213 OFFICE O/P EST LOW 20 MIN: CPT

## 2019-05-20 NOTE — HISTORY OF PRESENT ILLNESS
[Postpartum Follow Up] : postpartum follow up [Delivery Date: ___] : on [unfilled] [] : delivered by vaginal delivery [Female] : Delivery History: baby girl [Wt. ___] : weighing [unfilled] [Rubella Vaccine] : Rubella vaccine administered [Discharge HCT: ___] : hematocrit level was [unfilled] [Discharge HGB: ___] : hemoglobin level was [unfilled] [Intended Contraception] : Intended Contraception: [Tubal Ligation] : tubal ligation [None] : No associated symptoms are reported [Doing Well] : is doing well [No Sign of Infection] : is showing no signs of infection [Excellent Pain Control] : has excellent pain control [Breastfeeding] : not currently nursing [Resumed [place]] : has not resumed intercourse [Awake] : awake [Alert] : alert [Acute Distress] : no acute distress [Soft] : soft [Tender] : non tender [Distended] : not distended [Oriented x3] : oriented to person, place, and time [Depressed Mood] : not depressed [Flat Affect] : affect not flat [RRR, No Murmurs] : RRR, no murmurs [FreeTextEntry9] : asthma, preeclampsia without severe features [de-identified] : desires BTL, will schedule outpatient  [de-identified] : headaches relieved with motrin, occ abdominal cramping [de-identified] : BP normal today, moderately elevated with VNS, desires permanent sterilization [de-identified] : will take BPs at home, will schedule BTL as outpt, f/u in 5wks.  If blood pressures are > 140/90 of the pt has headaches or spots in front of her eyes she will go to the hospital.   She will make an appointment with a [facility] physician for blood pressure monitoring.  She will follow up with GYN to sterilization,  RTC 5 weeks.

## 2019-05-20 NOTE — END OF VISIT
[FreeTextEntry3] : [facility]\par \par I saw and evaluated Ms. PENA with Dr. Quiñonez and I agree with the documentation above.   I modified the note above (if indicated) and agree with its contents in the present form.  s/p  last week.  Preeclampsia without severe features.\par \par Monitor blood pressures and symptoms.  If > 140/90 to return to hospital.    To obtain PCP to monitor blood pressures.\par \par RTC 5 weeks assuming blood pressures normal and no signs or symptoms of preeclampsia. \par \par Dany Cotton MD\par \par \par \par \par \par

## 2019-05-24 DIAGNOSIS — Z71.89 OTHER SPECIFIED COUNSELING: ICD-10-CM

## 2019-05-29 ENCOUNTER — OUTPATIENT (OUTPATIENT)
Dept: OUTPATIENT SERVICES | Facility: HOSPITAL | Age: 34
LOS: 1 days | Discharge: HOME | End: 2019-05-29

## 2019-05-29 VITALS
HEART RATE: 80 BPM | TEMPERATURE: 97 F | SYSTOLIC BLOOD PRESSURE: 133 MMHG | HEIGHT: 61 IN | WEIGHT: 167.55 LBS | OXYGEN SATURATION: 99 % | DIASTOLIC BLOOD PRESSURE: 80 MMHG | RESPIRATION RATE: 17 BRPM

## 2019-05-29 DIAGNOSIS — Z98.890 OTHER SPECIFIED POSTPROCEDURAL STATES: Chronic | ICD-10-CM

## 2019-05-29 DIAGNOSIS — Z01.818 ENCOUNTER FOR OTHER PREPROCEDURAL EXAMINATION: ICD-10-CM

## 2019-05-29 DIAGNOSIS — Z30.2 ENCOUNTER FOR STERILIZATION: ICD-10-CM

## 2019-05-29 LAB
ALBUMIN SERPL ELPH-MCNC: 4 G/DL — SIGNIFICANT CHANGE UP (ref 3.5–5.2)
ALP SERPL-CCNC: 112 U/L — SIGNIFICANT CHANGE UP (ref 30–115)
ALT FLD-CCNC: 31 U/L — SIGNIFICANT CHANGE UP (ref 0–41)
ANION GAP SERPL CALC-SCNC: 15 MMOL/L — HIGH (ref 7–14)
APPEARANCE UR: CLEAR — SIGNIFICANT CHANGE UP
APTT BLD: 26.9 SEC — LOW (ref 27–39.2)
AST SERPL-CCNC: 19 U/L — SIGNIFICANT CHANGE UP (ref 0–41)
BASOPHILS # BLD AUTO: 0.06 K/UL — SIGNIFICANT CHANGE UP (ref 0–0.2)
BASOPHILS NFR BLD AUTO: 0.6 % — SIGNIFICANT CHANGE UP (ref 0–1)
BILIRUB SERPL-MCNC: <0.2 MG/DL — SIGNIFICANT CHANGE UP (ref 0.2–1.2)
BILIRUB UR-MCNC: NEGATIVE — SIGNIFICANT CHANGE UP
BUN SERPL-MCNC: 15 MG/DL — SIGNIFICANT CHANGE UP (ref 10–20)
CALCIUM SERPL-MCNC: 9.2 MG/DL — SIGNIFICANT CHANGE UP (ref 8.5–10.1)
CHLORIDE SERPL-SCNC: 105 MMOL/L — SIGNIFICANT CHANGE UP (ref 98–110)
CO2 SERPL-SCNC: 21 MMOL/L — SIGNIFICANT CHANGE UP (ref 17–32)
COLOR SPEC: YELLOW — SIGNIFICANT CHANGE UP
CREAT SERPL-MCNC: 0.5 MG/DL — LOW (ref 0.7–1.5)
DIFF PNL FLD: NEGATIVE — SIGNIFICANT CHANGE UP
EOSINOPHIL # BLD AUTO: 0.44 K/UL — SIGNIFICANT CHANGE UP (ref 0–0.7)
EOSINOPHIL NFR BLD AUTO: 4.1 % — SIGNIFICANT CHANGE UP (ref 0–8)
GLUCOSE SERPL-MCNC: 88 MG/DL — SIGNIFICANT CHANGE UP (ref 70–99)
GLUCOSE UR QL: NEGATIVE MG/DL — SIGNIFICANT CHANGE UP
HCT VFR BLD CALC: 33.2 % — LOW (ref 37–47)
HGB BLD-MCNC: 10.1 G/DL — LOW (ref 12–16)
IMM GRANULOCYTES NFR BLD AUTO: 0.5 % — HIGH (ref 0.1–0.3)
INR BLD: 0.91 RATIO — SIGNIFICANT CHANGE UP (ref 0.65–1.3)
KETONES UR-MCNC: NEGATIVE — SIGNIFICANT CHANGE UP
LEUKOCYTE ESTERASE UR-ACNC: NEGATIVE — SIGNIFICANT CHANGE UP
LYMPHOCYTES # BLD AUTO: 3.52 K/UL — HIGH (ref 1.2–3.4)
LYMPHOCYTES # BLD AUTO: 33 % — SIGNIFICANT CHANGE UP (ref 20.5–51.1)
MCHC RBC-ENTMCNC: 24 PG — LOW (ref 27–31)
MCHC RBC-ENTMCNC: 30.4 G/DL — LOW (ref 32–37)
MCV RBC AUTO: 78.9 FL — LOW (ref 81–99)
MONOCYTES # BLD AUTO: 0.63 K/UL — HIGH (ref 0.1–0.6)
MONOCYTES NFR BLD AUTO: 5.9 % — SIGNIFICANT CHANGE UP (ref 1.7–9.3)
NEUTROPHILS # BLD AUTO: 5.98 K/UL — SIGNIFICANT CHANGE UP (ref 1.4–6.5)
NEUTROPHILS NFR BLD AUTO: 55.9 % — SIGNIFICANT CHANGE UP (ref 42.2–75.2)
NITRITE UR-MCNC: NEGATIVE — SIGNIFICANT CHANGE UP
NRBC # BLD: 0 /100 WBCS — SIGNIFICANT CHANGE UP (ref 0–0)
PH UR: 5.5 — SIGNIFICANT CHANGE UP (ref 5–8)
PLATELET # BLD AUTO: 435 K/UL — HIGH (ref 130–400)
POTASSIUM SERPL-MCNC: 4.4 MMOL/L — SIGNIFICANT CHANGE UP (ref 3.5–5)
POTASSIUM SERPL-SCNC: 4.4 MMOL/L — SIGNIFICANT CHANGE UP (ref 3.5–5)
PROT SERPL-MCNC: 7 G/DL — SIGNIFICANT CHANGE UP (ref 6–8)
PROT UR-MCNC: ABNORMAL MG/DL
PROTHROM AB SERPL-ACNC: 10.5 SEC — SIGNIFICANT CHANGE UP (ref 9.95–12.87)
RBC # BLD: 4.21 M/UL — SIGNIFICANT CHANGE UP (ref 4.2–5.4)
RBC # FLD: 18.3 % — HIGH (ref 11.5–14.5)
SODIUM SERPL-SCNC: 141 MMOL/L — SIGNIFICANT CHANGE UP (ref 135–146)
SP GR SPEC: 1.02 — SIGNIFICANT CHANGE UP (ref 1.01–1.03)
UROBILINOGEN FLD QL: 1 MG/DL (ref 0.2–0.2)
WBC # BLD: 10.68 K/UL — SIGNIFICANT CHANGE UP (ref 4.8–10.8)
WBC # FLD AUTO: 10.68 K/UL — SIGNIFICANT CHANGE UP (ref 4.8–10.8)

## 2019-05-29 NOTE — H&P PST ADULT - REASON FOR ADMISSION
Pt is a 34 yr old female with 4 children youngest 3 weeks. Pt electing to have tubal ligation for permanent sterilization with Dr. Wong 06/04/19

## 2019-05-29 NOTE — H&P PST ADULT - HISTORY OF PRESENT ILLNESS
Pt is a 34 yr old female with 4 children youngest 3 weeks. Pt electing to have tubal ligation for permanent sterilization   Pt denies any CP, SOB, Palpitations or Dysuria  Pt states she can climb 1+ FOS with no SOB  Pt denies LEXIE   Pt has hx of Asthma- no issues for over year  Pt states this is a complete medical and surgical assessment  including prescribed and OTC medications

## 2019-05-30 LAB
CULTURE RESULTS: SIGNIFICANT CHANGE UP
SPECIMEN SOURCE: SIGNIFICANT CHANGE UP

## 2019-06-03 NOTE — ASU PATIENT PROFILE, ADULT - NS PRO PASSIVE SMOKE EXP
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 Pt noted temp this morning of 107. NO vomiting, no chest pain, no sob. Yes...

## 2019-06-04 ENCOUNTER — OUTPATIENT (OUTPATIENT)
Dept: OUTPATIENT SERVICES | Facility: HOSPITAL | Age: 34
LOS: 1 days | Discharge: HOME | End: 2019-06-04
Payer: MEDICAID

## 2019-06-04 VITALS
OXYGEN SATURATION: 96 % | DIASTOLIC BLOOD PRESSURE: 77 MMHG | SYSTOLIC BLOOD PRESSURE: 144 MMHG | HEART RATE: 82 BPM | RESPIRATION RATE: 15 BRPM

## 2019-06-04 VITALS
OXYGEN SATURATION: 98 % | WEIGHT: 167.55 LBS | TEMPERATURE: 98 F | HEIGHT: 61 IN | HEART RATE: 86 BPM | SYSTOLIC BLOOD PRESSURE: 122 MMHG | DIASTOLIC BLOOD PRESSURE: 77 MMHG | RESPIRATION RATE: 18 BRPM

## 2019-06-04 DIAGNOSIS — Z98.890 OTHER SPECIFIED POSTPROCEDURAL STATES: Chronic | ICD-10-CM

## 2019-06-04 PROCEDURE — 58671 LAPAROSCOPY TUBAL BLOCK: CPT

## 2019-06-04 RX ORDER — ONDANSETRON 8 MG/1
4 TABLET, FILM COATED ORAL ONCE
Refills: 0 | Status: DISCONTINUED | OUTPATIENT
Start: 2019-06-04 | End: 2019-06-19

## 2019-06-04 RX ORDER — OXYCODONE AND ACETAMINOPHEN 5; 325 MG/1; MG/1
1 TABLET ORAL EVERY 4 HOURS
Refills: 0 | Status: DISCONTINUED | OUTPATIENT
Start: 2019-06-04 | End: 2019-06-04

## 2019-06-04 RX ORDER — SODIUM CHLORIDE 9 MG/ML
1000 INJECTION, SOLUTION INTRAVENOUS
Refills: 0 | Status: DISCONTINUED | OUTPATIENT
Start: 2019-06-04 | End: 2019-06-19

## 2019-06-04 RX ORDER — HYDROMORPHONE HYDROCHLORIDE 2 MG/ML
0.5 INJECTION INTRAMUSCULAR; INTRAVENOUS; SUBCUTANEOUS
Refills: 0 | Status: DISCONTINUED | OUTPATIENT
Start: 2019-06-04 | End: 2019-06-04

## 2019-06-04 RX ADMIN — HYDROMORPHONE HYDROCHLORIDE 0.5 MILLIGRAM(S): 2 INJECTION INTRAMUSCULAR; INTRAVENOUS; SUBCUTANEOUS at 14:29

## 2019-06-04 RX ADMIN — HYDROMORPHONE HYDROCHLORIDE 0.5 MILLIGRAM(S): 2 INJECTION INTRAMUSCULAR; INTRAVENOUS; SUBCUTANEOUS at 14:03

## 2019-06-04 RX ADMIN — OXYCODONE AND ACETAMINOPHEN 1 TABLET(S): 5; 325 TABLET ORAL at 15:05

## 2019-06-04 RX ADMIN — SODIUM CHLORIDE 100 MILLILITER(S): 9 INJECTION, SOLUTION INTRAVENOUS at 14:04

## 2019-06-04 NOTE — BRIEF OPERATIVE NOTE - NSICDXBRIEFPOSTOP_GEN_ALL_CORE_FT
POST-OP DIAGNOSIS:  Multiparity 04-Jun-2019 13:41:24  Elisa Geller
POST-OP DIAGNOSIS:  Multiparity 04-Jun-2019 13:41:24  Elisa Geller

## 2019-06-04 NOTE — CHART NOTE - NSCHARTNOTEFT_GEN_A_CORE
PACU ANESTHESIA ADMISSION NOTE      Procedure: Laparoscopic bilateral tubal ligation    Post op diagnosis:  Multiparity      ____  Intubated  TV:______       Rate: ______      FiO2: ______    __x__  Patent Airway    __x__  Full return of protective reflexes    _x___  Full recovery from anesthesia / back to baseline     Vitals:   T:   97.7F        R:   12               BP:           142/71       Sat:      96             P: 83      Mental Status:  __x__ Awake   __x___ Alert   _____ Drowsy   _____ Sedated    Nausea/Vomiting:  x____ NO  ______Yes,   See Post - Op Orders          Pain Scale (0-10):  _4/10___    Treatment: ____ None    __x__ See Post - Op/PCA Orders    Post - Operative Fluids:   ____ Oral   ___x_ See Post - Op Orders    Plan: Discharge:   _x___Home       _____Floor     _____Critical Care    _____  Other:_________________    Comments: pt tolerated procedure well, no anesthesia related complications. Care of pt endorsed to PACU, report given to PACU RN.

## 2019-06-04 NOTE — BRIEF OPERATIVE NOTE - OPERATION/FINDINGS
Betancur catheter placed.  sponge stick in vagina  on laparoscopy, normal uterus, tubes and ovaries seen.

## 2019-06-04 NOTE — BRIEF OPERATIVE NOTE - NSICDXBRIEFPREOP_GEN_ALL_CORE_FT
PRE-OP DIAGNOSIS:  Multiparity 04-Jun-2019 13:41:09  Elisa Geller
PRE-OP DIAGNOSIS:  Multiparity 04-Jun-2019 13:41:09  Elisa Geller

## 2019-06-04 NOTE — ASU DISCHARGE PLAN (ADULT/PEDIATRIC) - CALL YOUR DOCTOR IF YOU HAVE ANY OF THE FOLLOWING:
Pain not relieved by Medications/Fever greater than (need to indicate Fahrenheit or Celsius)/Bleeding that does not stop Nausea and vomiting that does not stop/Unable to urinate/Pain not relieved by Medications/Increased irritability or sluggishness/Inability to tolerate liquids or foods/Fever greater than (need to indicate Fahrenheit or Celsius)/Wound/Surgical Site with redness, or foul smelling discharge or pus/Bleeding that does not stop

## 2019-06-04 NOTE — ASU DISCHARGE PLAN (ADULT/PEDIATRIC) - CARE PROVIDER_API CALL
Elisa Geller)  Obstetrics and Gynecology  [address] [place] [id number]  Phone: [phone]  Fax: [phone]  Follow Up Time:

## 2019-06-07 DIAGNOSIS — Z30.2 ENCOUNTER FOR STERILIZATION: ICD-10-CM

## 2019-06-07 DIAGNOSIS — J45.909 UNSPECIFIED ASTHMA, UNCOMPLICATED: ICD-10-CM

## 2019-06-07 DIAGNOSIS — F17.210 NICOTINE DEPENDENCE, CIGARETTES, UNCOMPLICATED: ICD-10-CM

## 2019-06-17 ENCOUNTER — APPOINTMENT (OUTPATIENT)
Dept: OBGYN | Facility: CLINIC | Age: 34
End: 2019-06-17
Payer: MEDICAID

## 2019-06-18 ENCOUNTER — APPOINTMENT (OUTPATIENT)
Dept: ANTEPARTUM | Facility: CLINIC | Age: 34
End: 2019-06-18

## 2019-06-24 ENCOUNTER — APPOINTMENT (OUTPATIENT)
Dept: OBGYN | Facility: CLINIC | Age: 34
End: 2019-06-24
Payer: MEDICAID

## 2019-06-24 ENCOUNTER — OUTPATIENT (OUTPATIENT)
Dept: OUTPATIENT SERVICES | Facility: HOSPITAL | Age: 34
LOS: 1 days | Discharge: HOME | End: 2019-06-24

## 2019-06-24 VITALS — BODY MASS INDEX: 32.01 KG/M2 | SYSTOLIC BLOOD PRESSURE: 120 MMHG | DIASTOLIC BLOOD PRESSURE: 80 MMHG | WEIGHT: 175 LBS

## 2019-06-24 DIAGNOSIS — Z98.890 OTHER SPECIFIED POSTPROCEDURAL STATES: Chronic | ICD-10-CM

## 2019-06-24 PROCEDURE — 99213 OFFICE O/P EST LOW 20 MIN: CPT | Mod: 24

## 2019-06-24 NOTE — HISTORY OF PRESENT ILLNESS
[Delivery Date: ___] : on [unfilled] [] : delivered by vaginal delivery [Female] : Delivery History: baby girl [Wt. ___] : weighing [unfilled] [Breastfeeding] : not currently nursing [Back to Normal] : is back to normal in size [None] : no vaginal bleeding [Normal] : the vagina was normal [Healing Well] : is healing well [Cervix Sample Taken] : cervical sample not taken for a Pap smear [Not Done] : Examination of breasts not done [Doing Well] : is doing well [No Sign of Infection] : is showing no signs of infection [Excellent Pain Control] : has excellent pain control [FreeTextEntry8] : Pt here for laparoscopic btl on 19, pt s/p  on 19. Upreg (-) today [de-identified] : BTL incisions healing well without evidence of infection. Sutures trimmed [de-identified] : Routine pp and btl check [de-identified] : RV in 6 months or prn

## 2019-08-13 ENCOUNTER — OUTPATIENT (OUTPATIENT)
Dept: OUTPATIENT SERVICES | Facility: HOSPITAL | Age: 34
LOS: 1 days | Discharge: HOME | End: 2019-08-13
Payer: MEDICAID

## 2019-08-13 DIAGNOSIS — R05 COUGH: ICD-10-CM

## 2019-08-13 DIAGNOSIS — Z98.890 OTHER SPECIFIED POSTPROCEDURAL STATES: Chronic | ICD-10-CM

## 2019-08-13 PROCEDURE — 71046 X-RAY EXAM CHEST 2 VIEWS: CPT | Mod: 26

## 2019-08-15 ENCOUNTER — EMERGENCY (EMERGENCY)
Facility: HOSPITAL | Age: 34
LOS: 0 days | Discharge: HOME | End: 2019-08-16
Attending: EMERGENCY MEDICINE | Admitting: EMERGENCY MEDICINE
Payer: MEDICAID

## 2019-08-15 VITALS
TEMPERATURE: 98 F | RESPIRATION RATE: 18 BRPM | SYSTOLIC BLOOD PRESSURE: 133 MMHG | OXYGEN SATURATION: 97 % | DIASTOLIC BLOOD PRESSURE: 80 MMHG | HEART RATE: 102 BPM

## 2019-08-15 DIAGNOSIS — J20.9 ACUTE BRONCHITIS, UNSPECIFIED: ICD-10-CM

## 2019-08-15 DIAGNOSIS — J45.901 UNSPECIFIED ASTHMA WITH (ACUTE) EXACERBATION: ICD-10-CM

## 2019-08-15 DIAGNOSIS — Z98.890 OTHER SPECIFIED POSTPROCEDURAL STATES: Chronic | ICD-10-CM

## 2019-08-15 DIAGNOSIS — R06.02 SHORTNESS OF BREATH: ICD-10-CM

## 2019-08-15 PROCEDURE — 93010 ELECTROCARDIOGRAM REPORT: CPT

## 2019-08-15 PROCEDURE — 71046 X-RAY EXAM CHEST 2 VIEWS: CPT | Mod: 26

## 2019-08-15 PROCEDURE — 99285 EMERGENCY DEPT VISIT HI MDM: CPT

## 2019-08-15 RX ORDER — DEXAMETHASONE 0.5 MG/5ML
10 ELIXIR ORAL ONCE
Refills: 0 | Status: COMPLETED | OUTPATIENT
Start: 2019-08-15 | End: 2019-08-15

## 2019-08-15 RX ORDER — IPRATROPIUM/ALBUTEROL SULFATE 18-103MCG
3 AEROSOL WITH ADAPTER (GRAM) INHALATION ONCE
Refills: 0 | Status: COMPLETED | OUTPATIENT
Start: 2019-08-15 | End: 2019-08-15

## 2019-08-15 RX ORDER — DEXAMETHASONE 0.5 MG/5ML
10 ELIXIR ORAL ONCE
Refills: 0 | Status: DISCONTINUED | OUTPATIENT
Start: 2019-08-15 | End: 2019-08-15

## 2019-08-15 RX ADMIN — Medication 3 MILLILITER(S): at 23:13

## 2019-08-15 RX ADMIN — Medication 3 MILLILITER(S): at 21:39

## 2019-08-15 RX ADMIN — Medication 10 MILLIGRAM(S): at 23:10

## 2019-08-15 NOTE — ED PROVIDER NOTE - OBJECTIVE STATEMENT
The pt is a 34y F with hx of asthma presenting with SOB and cough for the last 4 days. She typically uses Flovent daily, and albuterol prn but usually barely needs it. Since Sunday, she has been using her albuterol nonstop. She also endorses some chest discomfort/burning since Sunday as well. Pt has no other medical conditions. Denies recent travel, extended periods of immobilization, smoking, recent malignancy, ocp use. No hx of cardiac disease.

## 2019-08-15 NOTE — ED PROVIDER NOTE - NS ED ROS FT
Constitutional: (-) fever  Eyes/ENT: (-) blurry vision, (-) epistaxis  Cardiovascular: (+) chest discomfort/burning sensation, (-) syncope  Respiratory: (+) cough, (+) shortness of breath  Gastrointestinal: (-) vomiting, (-) diarrhea  Musculoskeletal: (-) neck pain, (-) back pain, (-) joint pain  Integumentary: (-) rash, (-) edema  Neurological: (-) headache, (-) altered mental status  Psychiatric: (-) hallucinations  Allergic/Immunologic: (-) pruritus

## 2019-08-15 NOTE — ED PROVIDER NOTE - PHYSICAL EXAMINATION
Vital Signs: I have reviewed the initial vital signs. Tachycardic 102.  Constitutional: well-nourished, appears stated age, no acute distress  Cardiovascular: regular rate, regular rhythm, well-perfused extremities  Respiratory: Bilateral expiratory wheezing in all lung fields  Gastrointestinal: soft, non-tender abdomen. Bowel sounds noted.   Musculoskeletal: supple neck, no lower extremity edema  Integumentary: warm, dry, no rash  Neurologic: awake, alert, extremities’ motor and sensory functions grossly intact  Psychiatric: appropriate mood, appropriate affect Vital Signs: I have reviewed the initial vital signs. Tachycardic 102.  Constitutional: well-nourished, appears stated age, no acute distress  Cardiovascular: regular rate, regular rhythm, well-perfused extremities  Respiratory: Bilateral expiratory wheezing in all lung fields  Gastrointestinal: soft, non-tender abdomen. Bowel sounds noted.   Musculoskeletal: supple neck, no lower extremity edema, asymmetries, or pain.   Integumentary: warm, dry, no rash  Neurologic: awake, alert, extremities’ motor and sensory functions grossly intact  Psychiatric: appropriate mood, appropriate affect

## 2019-08-15 NOTE — ED PROVIDER NOTE - PROGRESS NOTE DETAILS
feeling better, still getting meds, will reassess. feeling better. lungs mostly clear. speaking full sent. dimer neg. lungs clear. will dc on prednisone. outpt f.u.

## 2019-08-15 NOTE — ED PROVIDER NOTE - NSFOLLOWUPINSTRUCTIONS_ED_ALL_ED_FT
Asthma is a common lung disease affecting millions of people worldwide. It is characterized by narrowing of the airways (breathing tubes) in the lungs. This narrowing is partially or completely reversible. Symptoms of asthma include wheezing, coughing, chest tightness, and shortness of breath. These symptoms tend to come and go, and are related to the degree of airway narrowing in the lungs.     The factors that set off and worsen asthma symptoms are called "triggers." Identifying and avoiding asthma triggers are essential steps in preventing asthma flare-ups. Common asthma triggers generally fall into several categories:  ? Allergens (including dust, pollen, mold, cockroaches, mice, cats, and dogs)  ? Respiratory infections (including the common cold)  ? Irritants (such as tobacco smoke, chemicals, and strong odors or fumes)  ? Physical activity, especially when the air that is breathed is cold  ? Certain medicines, known as beta blockers  ? Emotional stress    After identifying potential asthma triggers, try to avoid your triggers entirely. If an asthma trigger cannot be completely avoided, try to limit your exposure as much as possible. You can also take an extra dose of your bronchodilator medication before exposure to an asthma trigger. This is a common approach prior to exercise, and we encourage you NOT to avoid exercise. Talk with a healthcare provider before using this approach in other circumstances, as it should only be used if limiting or avoiding exposure is not possible. Be careful not to use more than twice the amount of medication normally used.      Be sure to take all asthma medications as prescribed.   It is important that you are consistent and do not miss taking the ones that are meant to be taken daily, even if your breathing already feels well.           Acute Bronchitis    Bronchitis is inflammation of the airways that extend from the windpipe into the lungs (bronchi).   The inflammation often causes mucus to develop. This leads to a cough, which is the most common symptom of bronchitis.     In acute bronchitis, the condition usually develops suddenly and goes away over time, usually in a couple weeks. Smoking, allergies, and asthma can make bronchitis worse. Repeated episodes of bronchitis may cause further lung problems.     CAUSES  Acute bronchitis is most often caused by the same virus that causes a cold. The virus can spread from person to person (contagious) through coughing, sneezing, and touching contaminated objects.    SIGNS AND SYMPTOMS  Cough.    Fever.    Coughing up mucus.    Body aches.    Chest congestion.    Chills.    Shortness of breath.    Sore throat.      DIAGNOSIS  Acute bronchitis is usually diagnosed through a physical exam. Your health care provider will also ask you questions about your medical history. Tests, such as chest X-rays, are sometimes done to rule out other conditions.     TREATMENT  Acute bronchitis usually goes away in a couple weeks. Oftentimes, no medical treatment is necessary. Medicines are sometimes given for relief of fever or cough. Antibiotic medicines are usually not needed but may be prescribed in certain situations. In some cases, an inhaler may be recommended to help reduce shortness of breath and control the cough. A cool mist vaporizer may also be used to help thin bronchial secretions and make it easier to clear the chest.     HOME CARE INSTRUCTIONS  Get plenty of rest.    Drink enough fluids to keep your urine clear or pale yellow (unless you have a medical condition that requires fluid restriction). Increasing fluids may help thin your respiratory secretions (sputum) and reduce chest congestion, and it will prevent dehydration.    Take medicines only as directed by your health care provider.   If you were prescribed an antibiotic medicine, finish it all even if you start to feel better.   Avoid smoking and secondhand smoke. Exposure to cigarette smoke or irritating chemicals will make bronchitis worse. If you are a smoker, consider using nicotine gum or skin patches to help control withdrawal symptoms. Quitting smoking will help your lungs heal faster.    Reduce the chances of another bout of acute bronchitis by washing your hands frequently, avoiding people with cold symptoms, and trying not to touch your hands to your mouth, nose, or eyes.    Keep all follow-up visits as directed by your health care provider.      SEEK MEDICAL CARE IF:  Your symptoms do not improve after 1 week of treatment.     SEEK IMMEDIATE MEDICAL CARE IF:  You develop an increased fever or chills.    You have chest pain.    You have severe shortness of breath.  You have bloody sputum.     You develop dehydration.  You faint or repeatedly feel like you are going to pass out.  You develop repeated vomiting.  You develop a severe headache.     MAKE SURE YOU:  Understand these instructions.   Will watch your condition.  Will get help right away if you are not doing well or get worse.      ADDITIONAL NOTES AND INSTRUCTIONS    Please follow up with your Primary MD in 3-5 days.  Seek immediate medical care for any new/worsening signs or symptoms.

## 2019-08-15 NOTE — ED ADULT NURSE NOTE - NSIMPLEMENTINTERV_GEN_ALL_ED
Implemented All Universal Safety Interventions:  [place] to call system. Call bell, personal items and telephone within reach. Instruct patient to call for assistance. Room bathroom lighting operational. Non-slip footwear when patient is off stretcher. Physically safe environment: no spills, clutter or unnecessary equipment. Stretcher in lowest position, wheels locked, appropriate side rails in place.

## 2019-08-15 NOTE — ED ADULT NURSE NOTE - NS ED NURSE DC INFO COMPLEXITY
Verbalized Understanding/Simple: Patient demonstrates quick and easy understanding Verbalized Understanding/D/C instructions not given

## 2019-08-16 VITALS
TEMPERATURE: 97 F | SYSTOLIC BLOOD PRESSURE: 134 MMHG | OXYGEN SATURATION: 99 % | RESPIRATION RATE: 18 BRPM | DIASTOLIC BLOOD PRESSURE: 78 MMHG | HEART RATE: 98 BPM

## 2019-08-16 LAB — D DIMER BLD IA.RAPID-MCNC: 71 NG/ML DDU — SIGNIFICANT CHANGE UP (ref 0–230)

## 2019-08-29 ENCOUNTER — OUTPATIENT (OUTPATIENT)
Dept: OUTPATIENT SERVICES | Facility: HOSPITAL | Age: 34
LOS: 1 days | Discharge: HOME | End: 2019-08-29
Payer: MEDICAID

## 2019-08-29 DIAGNOSIS — Z98.890 OTHER SPECIFIED POSTPROCEDURAL STATES: Chronic | ICD-10-CM

## 2019-08-29 DIAGNOSIS — M25.532 PAIN IN LEFT WRIST: ICD-10-CM

## 2019-08-29 PROCEDURE — 73110 X-RAY EXAM OF WRIST: CPT | Mod: 26,LT

## 2019-10-25 ENCOUNTER — APPOINTMENT (OUTPATIENT)
Dept: PULMONOLOGY | Facility: CLINIC | Age: 34
End: 2019-10-25
Payer: MEDICAID

## 2019-10-25 ENCOUNTER — OUTPATIENT (OUTPATIENT)
Dept: OUTPATIENT SERVICES | Facility: HOSPITAL | Age: 34
LOS: 1 days | Discharge: HOME | End: 2019-10-25

## 2019-10-25 VITALS
HEART RATE: 91 BPM | SYSTOLIC BLOOD PRESSURE: 123 MMHG | HEIGHT: 62 IN | OXYGEN SATURATION: 96 % | WEIGHT: 194 LBS | BODY MASS INDEX: 35.7 KG/M2 | DIASTOLIC BLOOD PRESSURE: 80 MMHG

## 2019-10-25 DIAGNOSIS — Z98.890 OTHER SPECIFIED POSTPROCEDURAL STATES: Chronic | ICD-10-CM

## 2019-10-25 PROCEDURE — 99203 OFFICE O/P NEW LOW 30 MIN: CPT | Mod: GC

## 2019-10-25 NOTE — PHYSICAL EXAM
[General Appearance - Well Developed] : well developed [Normal Appearance] : normal appearance [General Appearance - Well Nourished] : well nourished [Normal Conjunctiva] : the conjunctiva exhibited no abnormalities [Normal Oropharynx] : normal oropharynx [Low Lying Soft Palate] : low lying soft palate [I] : I [Neck Appearance] : the appearance of the neck was normal [Heart Sounds] : normal S1 and S2 [Arterial Pulses Normal] : the arterial pulses were normal [Respiration, Rhythm And Depth] : normal respiratory rhythm and effort [Abdomen Tenderness] : non-tender [Abdomen Soft] : soft [Abdomen Mass (___ Cm)] : no abdominal mass palpated [Abnormal Walk] : normal gait [Gait - Sufficient For Exercise Testing] : the gait was sufficient for exercise testing [Nail Clubbing] : no clubbing of the fingernails [Cyanosis, Localized] : no localized cyanosis [Petechial Hemorrhages (___cm)] : no petechial hemorrhages [Skin Color & Pigmentation] : normal skin color and pigmentation [Skin Turgor] : normal skin turgor [] : no rash [Oriented To Time, Place, And Person] : oriented to person, place, and time [Affect] : the affect was normal [Elongated Uvula] : no elongated uvula [Enlarged Base of the Tongue] : no enlargement of the base of the tongue [FreeTextEntry1] : No focal deficits, gross motor function normal

## 2019-10-25 NOTE — ASSESSMENT
[FreeTextEntry1] : 35 y/o F w/ PMH of Asthma presenting for worsening asthma. \par \par - c/w Breo, Ventolin and Singulair \par - PFTs \par - RTC in 6 months

## 2019-10-25 NOTE — HISTORY OF PRESENT ILLNESS
[FreeTextEntry1] : 33 y/o F w/ PMH of Asthma and former smoker, quit 1 year ago, presenting for worsening symptoms. Pt's asthma started 7 years and has been getting progressively worse. Prior to her pregnancy pts asthma was well managed with albuterol about once a day before bed. After getting pregnant 1.5 yrs ago pts asthma started getting worse, requiring albuterol 3x/day and singular and postpartum symptoms progressed requiring Flovent, Singulair and albuterol 5x/day. Pt states she started getting symptoms with minimal exertion and a cough that was relieved by albuterol. Pt had nighttime awakenings about 5-6x/week. Pt did not notice any particular triggers. \par Currently pt using Breo, Ventolin and Singulair. She also gets intermittent Prednisone treatments of 4 days every few weeks. Since starting the pt has nighttime symptoms 2x/wk. Pt does  not require albuterol when using Breo however pt has been avoiding significant exertion. Pt states she get symptoms after walking about 10 blocks. \par Pt has never been hospitalized or intubated for asthma. \par Pt endorses some snoring at night, waking up fatigue and frequent headaches. \par Smoked a pack every 3 days.

## 2019-10-29 DIAGNOSIS — J45.909 UNSPECIFIED ASTHMA, UNCOMPLICATED: ICD-10-CM

## 2019-11-26 ENCOUNTER — APPOINTMENT (OUTPATIENT)
Dept: NEUROLOGY | Facility: CLINIC | Age: 34
End: 2019-11-26

## 2019-12-10 NOTE — H&P PST ADULT - HIV STATUS
CERTIFICATE OF WORK    December 10, 2019      Re:   Graeme Flynn  [address] [facility] [id number]                        This is to certify that Graeme Flynn has been under my care from 11/25/19 and can return to light work on 12/16/19.     RESTRICTIONS: No lifting anything over 20lbs.  Until 12/30/19      REMARKS:         SIGNATURE:___________________________________________,   12/10/2019      MD Padmaja Guillen, DANA  [place] [facility]  [address] [address] [place] [id number] [phone]  [phone]    
Negative/Unknown

## 2020-05-22 ENCOUNTER — APPOINTMENT (OUTPATIENT)
Dept: PULMONOLOGY | Facility: CLINIC | Age: 35
End: 2020-05-22

## 2020-07-03 NOTE — OB PROVIDER IHI INDUCTION/AUGMENTATION NOTE - NS_OBIHIINDICATION_OBGYN_ALL_OB
Problem: Skin Integrity:  Goal: Will show no infection signs and symptoms    Outcome: Ongoing  Note: Skin assessment completed every shift. Pt assessed for incontinence, appropriate barrier cream applied prn. Pt encouraged to turn/rotate every 2 hours. Assistance provided if pt unable to do so themselves. Problem: Falls - Risk of:  Goal: Will remain free from falls    Outcome: Ongoing  Note: Fall risk assessment completed every shift. All precautions in place. Pt has call light within reach at all times. Room clear of clutter. Pt aware to call for assistance when getting up. Problem: Gas Exchange - Impaired  Goal: Absence of hypoxia    Outcome: Ongoing  Note: Assess breath sounds, oxygen requirements and saturations, and activity tolerance. Monitor intake and output and daily weights and lab values. Patient encouraged to prone during the shift to improve oxygenation. Elective Induction

## 2020-08-13 ENCOUNTER — OUTPATIENT (OUTPATIENT)
Dept: OUTPATIENT SERVICES | Facility: HOSPITAL | Age: 35
LOS: 1 days | Discharge: HOME | End: 2020-08-13

## 2020-08-13 DIAGNOSIS — Z98.890 OTHER SPECIFIED POSTPROCEDURAL STATES: Chronic | ICD-10-CM

## 2020-08-17 ENCOUNTER — OUTPATIENT (OUTPATIENT)
Dept: OUTPATIENT SERVICES | Facility: HOSPITAL | Age: 35
LOS: 1 days | Discharge: HOME | End: 2020-08-17

## 2020-08-17 ENCOUNTER — LABORATORY RESULT (OUTPATIENT)
Age: 35
End: 2020-08-17

## 2020-08-17 DIAGNOSIS — Z11.59 ENCOUNTER FOR SCREENING FOR OTHER VIRAL DISEASES: ICD-10-CM

## 2020-08-17 DIAGNOSIS — Z98.890 OTHER SPECIFIED POSTPROCEDURAL STATES: Chronic | ICD-10-CM

## 2020-08-24 ENCOUNTER — OUTPATIENT (OUTPATIENT)
Dept: OUTPATIENT SERVICES | Facility: HOSPITAL | Age: 35
LOS: 1 days | Discharge: HOME | End: 2020-08-24

## 2020-08-24 DIAGNOSIS — Z11.59 ENCOUNTER FOR SCREENING FOR OTHER VIRAL DISEASES: ICD-10-CM

## 2020-08-24 DIAGNOSIS — Z98.890 OTHER SPECIFIED POSTPROCEDURAL STATES: Chronic | ICD-10-CM

## 2020-08-26 ENCOUNTER — OUTPATIENT (OUTPATIENT)
Dept: OUTPATIENT SERVICES | Facility: HOSPITAL | Age: 35
LOS: 1 days | Discharge: HOME | End: 2020-08-26

## 2020-08-26 DIAGNOSIS — Z00.00 ENCOUNTER FOR GENERAL ADULT MEDICAL EXAMINATION WITHOUT ABNORMAL FINDINGS: ICD-10-CM

## 2020-08-26 DIAGNOSIS — Z98.890 OTHER SPECIFIED POSTPROCEDURAL STATES: Chronic | ICD-10-CM

## 2020-08-28 ENCOUNTER — OUTPATIENT (OUTPATIENT)
Dept: OUTPATIENT SERVICES | Facility: HOSPITAL | Age: 35
LOS: 1 days | Discharge: HOME | End: 2020-08-28

## 2020-08-28 ENCOUNTER — APPOINTMENT (OUTPATIENT)
Dept: PULMONOLOGY | Facility: CLINIC | Age: 35
End: 2020-08-28
Payer: MEDICAID

## 2020-08-28 VITALS
DIASTOLIC BLOOD PRESSURE: 66 MMHG | HEART RATE: 82 BPM | TEMPERATURE: 98.7 F | BODY MASS INDEX: 35.88 KG/M2 | SYSTOLIC BLOOD PRESSURE: 96 MMHG | OXYGEN SATURATION: 95 % | WEIGHT: 195 LBS | HEIGHT: 62 IN

## 2020-08-28 DIAGNOSIS — Z98.890 OTHER SPECIFIED POSTPROCEDURAL STATES: Chronic | ICD-10-CM

## 2020-08-28 PROCEDURE — 99203 OFFICE O/P NEW LOW 30 MIN: CPT | Mod: GC

## 2020-08-28 NOTE — REVIEW OF SYSTEMS
[Cough] : cough [SOB on Exertion] : sob on exertion [Negative] : Endocrine [Hemoptysis] : no hemoptysis [Chest Tightness] : no chest tightness [Sputum] : no sputum [Dyspnea] : no dyspnea [Pleuritic Pain] : no pleuritic pain [A.M. Dry Mouth] : no a.m. dry mouth

## 2020-08-28 NOTE — PHYSICAL EXAM
[No Acute Distress] : no acute distress [Normal Appearance] : normal appearance [Normal Oropharynx] : normal oropharynx [No Neck Mass] : no neck mass [Normal Rate/Rhythm] : normal rate/rhythm [No Murmurs] : no murmurs [Normal S1, S2] : normal s1, s2 [No Resp Distress] : no resp distress [No Edema] : no edema [Clear to Auscultation Bilaterally] : clear to auscultation bilaterally

## 2020-08-28 NOTE — ASSESSMENT
[FreeTextEntry1] : 1) Asthma\par -Has been on prednisone 10mg daily for past month; advised to taper off and to take maintenance inhalers even when on prednisone\par -Exacerbation over last 5 days\par -Renewed Breo/ ventolin/ singulair\par -Follow-up in 6mo

## 2020-08-28 NOTE — HISTORY OF PRESENT ILLNESS
[Former] : former [TextBox_4] : Patient is a 33 y/o woman with PMH of asthma presenting for follow-up. Patient states she has been taking 10mg Prednisone orally for the past month. Patient states that when taking the prednisone she feels no symptoms of asthma and does not take any inhalers, including even the maintenance inhalers. Patient states she has not taken prednisone over last 5 days and has had recurrence of her symptoms. States she takes albuterol inhaler 5x/day each day and states she has 3 episodes of waking each night due to her asthma. States over the past 5 days she has taken her maintenance inhalers as needed. Patient states asthma is triggered by small activities such as walking up and down a set of stairs. Denies any recurrent cigarette smoking.\par \par \par \par \par

## 2020-12-21 PROBLEM — Z86.19 HISTORY OF CANDIDIASIS OF VAGINA: Status: RESOLVED | Noted: 2019-05-03 | Resolved: 2020-12-21

## 2020-12-23 ENCOUNTER — APPOINTMENT (OUTPATIENT)
Dept: SURGERY | Facility: CLINIC | Age: 35
End: 2020-12-23
Payer: MEDICAID

## 2020-12-23 VITALS
DIASTOLIC BLOOD PRESSURE: 70 MMHG | WEIGHT: 208 LBS | HEIGHT: 61 IN | SYSTOLIC BLOOD PRESSURE: 124 MMHG | BODY MASS INDEX: 39.27 KG/M2 | TEMPERATURE: 97.2 F

## 2020-12-23 PROCEDURE — 99072 ADDL SUPL MATRL&STAF TM PHE: CPT

## 2020-12-23 PROCEDURE — 99244 OFF/OP CNSLTJ NEW/EST MOD 40: CPT

## 2020-12-23 RX ORDER — PRENATAL VIT NO.130/IRON/FOLIC 27MG-0.8MG
27-0.8 TABLET ORAL
Qty: 30 | Refills: 0 | Status: DISCONTINUED | COMMUNITY
Start: 2018-10-15 | End: 2020-12-23

## 2020-12-23 NOTE — PLAN
[FreeTextEntry1] : She will have bloodwork and evaluation by a psychologist, cardiologist, pulmonologist (for asthma treatment optimization as well as sleep study),, cardiology, and nutritionist. Will see her back in 2 months.

## 2020-12-23 NOTE — ASSESSMENT
[FreeTextEntry1] : 34 y/o female suffering from obesity with comorbidities including asthma and acid reflux.  She may also have sleep apnea.  She is interested in the laparoscopic sleeve gastrectomy

## 2020-12-23 NOTE — HISTORY OF PRESENT ILLNESS
[de-identified] : Gypsy is a very pleasant 35 year old female here to discuss surgical weight loss options.  She reports that her comfortable adult weight is around 130 pounds.  She has 4 children, ranging in age from 1.5 years old to 10 years old.  Her pre-pregnancy weight was 105 pounds.  After her first 3 pregnancies, she was able to lose weight down to 130 pounds, however after her last child she has consistently gained weight.  her weight today is the highest she has been. She expresses motivation to lose weight so she can feel better physically and emotionally.  Her asthma and GERD have worsened with her weight gain, and she thinks she might now have symptoms of sleep apnea.

## 2020-12-23 NOTE — REVIEW OF SYSTEMS
[Shortness Of Breath] : shortness of breath [Wheezing] : wheezing [Reflux/Heartburn] : reflux/heartburn [Negative] : Allergic/Immunologic

## 2021-01-05 ENCOUNTER — APPOINTMENT (OUTPATIENT)
Dept: CARDIOLOGY | Facility: CLINIC | Age: 36
End: 2021-01-05
Payer: MEDICAID

## 2021-01-05 VITALS
BODY MASS INDEX: 38.33 KG/M2 | SYSTOLIC BLOOD PRESSURE: 110 MMHG | HEART RATE: 85 BPM | HEIGHT: 61 IN | TEMPERATURE: 97.7 F | WEIGHT: 203 LBS | DIASTOLIC BLOOD PRESSURE: 78 MMHG

## 2021-01-05 PROCEDURE — 93000 ELECTROCARDIOGRAM COMPLETE: CPT

## 2021-01-05 PROCEDURE — 99205 OFFICE O/P NEW HI 60 MIN: CPT

## 2021-01-05 PROCEDURE — 99072 ADDL SUPL MATRL&STAF TM PHE: CPT

## 2021-01-05 RX ORDER — ALBUTEROL SULFATE 90 UG/1
108 (90 BASE) AEROSOL, METERED RESPIRATORY (INHALATION)
Qty: 2 | Refills: 3 | Status: DISCONTINUED | COMMUNITY
Start: 2019-10-25 | End: 2021-01-05

## 2021-01-08 ENCOUNTER — OUTPATIENT (OUTPATIENT)
Dept: OUTPATIENT SERVICES | Facility: HOSPITAL | Age: 36
LOS: 1 days | Discharge: HOME | End: 2021-01-08

## 2021-01-08 DIAGNOSIS — Z98.890 OTHER SPECIFIED POSTPROCEDURAL STATES: Chronic | ICD-10-CM

## 2021-01-08 DIAGNOSIS — F50.9 EATING DISORDER, UNSPECIFIED: ICD-10-CM

## 2021-01-11 DIAGNOSIS — F50.9 EATING DISORDER, UNSPECIFIED: ICD-10-CM

## 2021-01-13 ENCOUNTER — APPOINTMENT (OUTPATIENT)
Dept: SURGERY | Facility: CLINIC | Age: 36
End: 2021-01-13
Payer: SELF-PAY

## 2021-01-13 PROCEDURE — SI006: CPT | Mod: NC

## 2021-01-15 DIAGNOSIS — Z78.9 OTHER SPECIFIED HEALTH STATUS: ICD-10-CM

## 2021-01-15 DIAGNOSIS — O09.93 SUPERVISION OF HIGH RISK PREGNANCY, UNSPECIFIED, THIRD TRIMESTER: ICD-10-CM

## 2021-01-15 DIAGNOSIS — O12.13 GESTATIONAL PROTEINURIA, THIRD TRIMESTER: ICD-10-CM

## 2021-01-19 ENCOUNTER — APPOINTMENT (OUTPATIENT)
Dept: SURGERY | Facility: CLINIC | Age: 36
End: 2021-01-19

## 2021-01-22 ENCOUNTER — APPOINTMENT (OUTPATIENT)
Dept: CARDIOLOGY | Facility: CLINIC | Age: 36
End: 2021-01-22

## 2021-01-29 ENCOUNTER — APPOINTMENT (OUTPATIENT)
Dept: PULMONOLOGY | Facility: CLINIC | Age: 36
End: 2021-01-29

## 2021-02-08 ENCOUNTER — APPOINTMENT (OUTPATIENT)
Dept: CARDIOLOGY | Facility: CLINIC | Age: 36
End: 2021-02-08

## 2021-02-10 DIAGNOSIS — Z87.59 PERSONAL HISTORY OF OTHER COMPLICATIONS OF PREGNANCY, CHILDBIRTH AND THE PUERPERIUM: ICD-10-CM

## 2021-02-11 ENCOUNTER — APPOINTMENT (OUTPATIENT)
Dept: SURGERY | Facility: CLINIC | Age: 36
End: 2021-02-11

## 2021-02-22 ENCOUNTER — NON-APPOINTMENT (OUTPATIENT)
Age: 36
End: 2021-02-22

## 2021-02-22 ENCOUNTER — APPOINTMENT (OUTPATIENT)
Dept: CARDIOLOGY | Facility: CLINIC | Age: 36
End: 2021-02-22

## 2021-03-12 ENCOUNTER — APPOINTMENT (OUTPATIENT)
Dept: PULMONOLOGY | Facility: CLINIC | Age: 36
End: 2021-03-12
Payer: MEDICAID

## 2021-03-12 ENCOUNTER — OUTPATIENT (OUTPATIENT)
Dept: OUTPATIENT SERVICES | Facility: HOSPITAL | Age: 36
LOS: 1 days | Discharge: HOME | End: 2021-03-12

## 2021-03-12 ENCOUNTER — OUTPATIENT (OUTPATIENT)
Dept: OUTPATIENT SERVICES | Facility: HOSPITAL | Age: 36
LOS: 1 days | Discharge: HOME | End: 2021-03-12
Payer: MEDICAID

## 2021-03-12 VITALS
HEIGHT: 61 IN | BODY MASS INDEX: 39.46 KG/M2 | HEART RATE: 101 BPM | DIASTOLIC BLOOD PRESSURE: 75 MMHG | SYSTOLIC BLOOD PRESSURE: 106 MMHG | OXYGEN SATURATION: 98 % | TEMPERATURE: 96.2 F | WEIGHT: 209 LBS

## 2021-03-12 DIAGNOSIS — Z98.890 OTHER SPECIFIED POSTPROCEDURAL STATES: Chronic | ICD-10-CM

## 2021-03-12 DIAGNOSIS — Z01.818 ENCOUNTER FOR OTHER PREPROCEDURAL EXAMINATION: ICD-10-CM

## 2021-03-12 PROCEDURE — 99213 OFFICE O/P EST LOW 20 MIN: CPT | Mod: GC

## 2021-03-12 PROCEDURE — 71046 X-RAY EXAM CHEST 2 VIEWS: CPT | Mod: 26

## 2021-03-12 NOTE — PHYSICAL EXAM
[No Acute Distress] : no acute distress [Normal Oropharynx] : normal oropharynx [I] : Mallampati Class: I [Normal Appearance] : normal appearance [Normal Rate/Rhythm] : normal rate/rhythm [Normal S1, S2] : normal s1, s2 [No Murmurs] : no murmurs [No Resp Distress] : no resp distress [No Acc Muscle Use] : no acc muscle use [Clear to Auscultation Bilaterally] : clear to auscultation bilaterally [No Abnormalities] : no abnormalities [Benign] : benign [Normal Gait] : normal gait [No Clubbing] : no clubbing [No Edema] : no edema [No Focal Deficits] : no focal deficits [Oriented x3] : oriented x3 [Normal Affect] : normal affect

## 2021-03-16 ENCOUNTER — OUTPATIENT (OUTPATIENT)
Dept: OUTPATIENT SERVICES | Facility: HOSPITAL | Age: 36
LOS: 1 days | Discharge: HOME | End: 2021-03-16

## 2021-03-16 DIAGNOSIS — Z11.59 ENCOUNTER FOR SCREENING FOR OTHER VIRAL DISEASES: ICD-10-CM

## 2021-03-16 DIAGNOSIS — Z98.890 OTHER SPECIFIED POSTPROCEDURAL STATES: Chronic | ICD-10-CM

## 2021-03-17 NOTE — HISTORY OF PRESENT ILLNESS
[Current] : current [TextBox_4] : Patient is a 35 y/o woman with PMH of asthma presenting for follow-up and for clearance for sleeve gastrectomy. She reports when she forgets or does not have her maintenance meds (breo and symbicort) she wakes up every night to use her inhaler and has multiple attacks throughout the day. When she uses her meds as directed she does not need use her rescue inhaler at all.\par \par She does not snore or feel tired throughout the day. She does not wake up at night choking or gasping for breath.\par \par Smokes 3-4 cigarettes daily for past few months. Used to smoke 10 cigarettes daily for 10 years.\par

## 2021-03-17 NOTE — ASSESSMENT
[FreeTextEntry1] : # Asthma\par # Preop clearance\par -Well controlled asthma when she is compliant with her meds\par -Patient counselled on need for compliance\par -Repeat PFTs\par -Chest xray\par -Protocol for bariatric surgery needs sleep study. STOP-BANG of 2 (weight and neck circumference). Low risk and no symptoms of LEXIE.\par -RTC in 6 weeks

## 2021-03-19 ENCOUNTER — OUTPATIENT (OUTPATIENT)
Dept: OUTPATIENT SERVICES | Facility: HOSPITAL | Age: 36
LOS: 1 days | Discharge: HOME | End: 2021-03-19

## 2021-03-19 DIAGNOSIS — Z98.890 OTHER SPECIFIED POSTPROCEDURAL STATES: Chronic | ICD-10-CM

## 2021-03-19 DIAGNOSIS — Z01.818 ENCOUNTER FOR OTHER PREPROCEDURAL EXAMINATION: ICD-10-CM

## 2021-03-22 ENCOUNTER — OUTPATIENT (OUTPATIENT)
Dept: OUTPATIENT SERVICES | Facility: HOSPITAL | Age: 36
LOS: 1 days | Discharge: HOME | End: 2021-03-22
Payer: MEDICAID

## 2021-03-22 DIAGNOSIS — Z98.890 OTHER SPECIFIED POSTPROCEDURAL STATES: Chronic | ICD-10-CM

## 2021-03-22 PROCEDURE — 95806 SLEEP STUDY UNATT&RESP EFFT: CPT | Mod: 26

## 2021-03-23 DIAGNOSIS — G47.33 OBSTRUCTIVE SLEEP APNEA (ADULT) (PEDIATRIC): ICD-10-CM

## 2021-03-25 ENCOUNTER — APPOINTMENT (OUTPATIENT)
Dept: SURGERY | Facility: CLINIC | Age: 36
End: 2021-03-25
Payer: MEDICAID

## 2021-03-25 VITALS — BODY MASS INDEX: 39.46 KG/M2 | WEIGHT: 209 LBS | HEIGHT: 61 IN

## 2021-03-25 PROCEDURE — ZZZZZ: CPT

## 2021-04-20 ENCOUNTER — APPOINTMENT (OUTPATIENT)
Dept: SURGERY | Facility: CLINIC | Age: 36
End: 2021-04-20
Payer: MEDICAID

## 2021-04-20 VITALS — HEIGHT: 61 IN

## 2021-04-20 PROCEDURE — ZZZZZ: CPT

## 2021-04-30 ENCOUNTER — APPOINTMENT (OUTPATIENT)
Dept: CARDIOLOGY | Facility: CLINIC | Age: 36
End: 2021-04-30
Payer: MEDICAID

## 2021-04-30 PROCEDURE — 99072 ADDL SUPL MATRL&STAF TM PHE: CPT

## 2021-04-30 PROCEDURE — 93306 TTE W/DOPPLER COMPLETE: CPT

## 2021-04-30 PROCEDURE — 93015 CV STRESS TEST SUPVJ I&R: CPT

## 2021-05-05 ENCOUNTER — APPOINTMENT (OUTPATIENT)
Dept: SURGERY | Facility: CLINIC | Age: 36
End: 2021-05-05
Payer: MEDICAID

## 2021-05-05 VITALS
SYSTOLIC BLOOD PRESSURE: 118 MMHG | TEMPERATURE: 97.8 F | HEIGHT: 61 IN | HEART RATE: 94 BPM | DIASTOLIC BLOOD PRESSURE: 72 MMHG | BODY MASS INDEX: 39.08 KG/M2 | WEIGHT: 207 LBS

## 2021-05-05 PROCEDURE — 99213 OFFICE O/P EST LOW 20 MIN: CPT

## 2021-05-05 PROCEDURE — 99072 ADDL SUPL MATRL&STAF TM PHE: CPT

## 2021-05-05 RX ORDER — FAMOTIDINE 20 MG/1
20 TABLET, FILM COATED ORAL
Qty: 30 | Refills: 0 | Status: DISCONTINUED | COMMUNITY
Start: 2019-01-03 | End: 2021-05-05

## 2021-05-05 NOTE — ASSESSMENT
[FreeTextEntry1] : 34 y/o female with class 2 obesity, interested in laparoscopic sleeve gastrectomy.

## 2021-05-05 NOTE — PLAN
[FreeTextEntry1] : She needs to complete a stress echo, as her treadmill stress test was abnormal. She will f/u with Dr. Chavez.\par She has f/u appt scheduled with pulmonology\par She had her Vit D rechecked, will obtain results.\par Having EGD this month\par She has an appt scheduled with our nutritionist in June\par Continue to make positive lifestyle changes \par

## 2021-05-05 NOTE — HISTORY OF PRESENT ILLNESS
[de-identified] : Gypsy is preparing for laparoscopic sleeve gastrectomy.  She is working with our nutritionist on making healthy lifestyle changes.

## 2021-05-06 ENCOUNTER — NON-APPOINTMENT (OUTPATIENT)
Age: 36
End: 2021-05-06

## 2021-05-07 ENCOUNTER — OUTPATIENT (OUTPATIENT)
Dept: OUTPATIENT SERVICES | Facility: HOSPITAL | Age: 36
LOS: 1 days | Discharge: HOME | End: 2021-05-07

## 2021-05-07 ENCOUNTER — APPOINTMENT (OUTPATIENT)
Dept: PULMONOLOGY | Facility: CLINIC | Age: 36
End: 2021-05-07
Payer: MEDICAID

## 2021-05-07 VITALS
DIASTOLIC BLOOD PRESSURE: 77 MMHG | TEMPERATURE: 97.5 F | BODY MASS INDEX: 39.65 KG/M2 | SYSTOLIC BLOOD PRESSURE: 138 MMHG | WEIGHT: 210 LBS | HEART RATE: 72 BPM | HEIGHT: 61 IN

## 2021-05-07 DIAGNOSIS — Z98.890 OTHER SPECIFIED POSTPROCEDURAL STATES: Chronic | ICD-10-CM

## 2021-05-07 PROCEDURE — 99213 OFFICE O/P EST LOW 20 MIN: CPT | Mod: GC

## 2021-05-07 RX ORDER — ALBUTEROL SULFATE 90 UG/1
108 (90 BASE) AEROSOL, METERED RESPIRATORY (INHALATION) EVERY 4 HOURS
Qty: 1 | Refills: 1 | Status: ACTIVE | COMMUNITY
Start: 2019-05-20 | End: 1900-01-01

## 2021-05-07 RX ORDER — FLUTICASONE FUROATE AND VILANTEROL TRIFENATATE 200; 25 UG/1; UG/1
200-25 POWDER RESPIRATORY (INHALATION) DAILY
Qty: 3 | Refills: 3 | Status: ACTIVE | COMMUNITY
Start: 2019-10-25 | End: 1900-01-01

## 2021-05-07 RX ORDER — MONTELUKAST 10 MG/1
10 TABLET, FILM COATED ORAL
Qty: 1 | Refills: 2 | Status: ACTIVE | COMMUNITY
Start: 2019-10-25 | End: 1900-01-01

## 2021-05-07 NOTE — ASSESSMENT
[FreeTextEntry1] : Pt is a 34 yo F with PMH of Asthma who comes to the clinic for follow up on her results. \par Pt is being planned for sleeve gastrectomy. \par Pt had CXr, Sleep study and PFTs done. \par Sleep study showed AHI 6. Mild LEXIE. \par Pt reports compliance with inhalers. \par \par Pt stopped smoking 1 month ago.\par \par # Asthma\par - Severe persistent Asthma?- reports daily night time awakening needing rescue inhaler\par - PFTs not consistent with severe Asthma\par - On breo, Albuterol prn\par -Chest xray Recent- No abnormality \par - Sleep study - Mild LEXIE, no rx needed \par - C/w same inhalers, will prescribe flonase\par - Acceptable risk from the pulmonary perspective for the planned bariatric surgery

## 2021-05-07 NOTE — HISTORY OF PRESENT ILLNESS
[TextBox_4] : Pt is a 34 yo F with PMH of Asthma who comes to the clinic for follow up on her results. \par Pt is being planned for sleeve gastrectomy. \par Pt had CXr, Sleep study and PFTs done. \par Sleep study showed AHI 6. Mild LEXIE. \par Pt reports compliance with inhalers. \par \par Pt stopped smoking 1 month ago.

## 2021-05-07 NOTE — REVIEW OF SYSTEMS
[Cough] : cough [Chest Tightness] : chest tightness [Wheezing] : wheezing [Negative] : Genitourinary [Hemoptysis] : no hemoptysis [Sputum] : no sputum [Dyspnea] : no dyspnea [A.M. Dry Mouth] : no a.m. dry mouth [SOB on Exertion] : no sob on exertion

## 2021-05-07 NOTE — PHYSICAL EXAM
[No Acute Distress] : no acute distress [Normal S1, S2] : normal s1, s2 [No Murmurs] : no murmurs [No Resp Distress] : no resp distress [No Acc Muscle Use] : no acc muscle use [Wheeze] : wheeze [Normal Gait] : normal gait [No Clubbing] : no clubbing [No Edema] : no edema

## 2021-05-14 DIAGNOSIS — J45.909 UNSPECIFIED ASTHMA, UNCOMPLICATED: ICD-10-CM

## 2021-06-10 ENCOUNTER — APPOINTMENT (OUTPATIENT)
Dept: CARDIOLOGY | Facility: CLINIC | Age: 36
End: 2021-06-10
Payer: MEDICAID

## 2021-06-10 DIAGNOSIS — R06.02 SHORTNESS OF BREATH: ICD-10-CM

## 2021-06-10 PROCEDURE — 93351 STRESS TTE COMPLETE: CPT

## 2021-06-10 PROCEDURE — 99072 ADDL SUPL MATRL&STAF TM PHE: CPT

## 2021-06-21 ENCOUNTER — APPOINTMENT (OUTPATIENT)
Dept: SURGERY | Facility: CLINIC | Age: 36
End: 2021-06-21
Payer: MEDICAID

## 2021-06-21 VITALS — BODY MASS INDEX: 39.08 KG/M2 | WEIGHT: 207 LBS | HEIGHT: 61 IN

## 2021-06-21 PROCEDURE — ZZZZZ: CPT

## 2021-07-15 ENCOUNTER — APPOINTMENT (OUTPATIENT)
Dept: CARDIOLOGY | Facility: CLINIC | Age: 36
End: 2021-07-15
Payer: MEDICAID

## 2021-07-15 VITALS
SYSTOLIC BLOOD PRESSURE: 120 MMHG | TEMPERATURE: 97.5 F | DIASTOLIC BLOOD PRESSURE: 70 MMHG | WEIGHT: 208 LBS | HEART RATE: 91 BPM | HEIGHT: 61 IN | BODY MASS INDEX: 39.27 KG/M2

## 2021-07-15 DIAGNOSIS — R94.39 ABNORMAL RESULT OF OTHER CARDIOVASCULAR FUNCTION STUDY: ICD-10-CM

## 2021-07-15 DIAGNOSIS — R01.1 CARDIAC MURMUR, UNSPECIFIED: ICD-10-CM

## 2021-07-15 PROCEDURE — 93000 ELECTROCARDIOGRAM COMPLETE: CPT

## 2021-07-15 PROCEDURE — 99072 ADDL SUPL MATRL&STAF TM PHE: CPT

## 2021-07-15 PROCEDURE — 99213 OFFICE O/P EST LOW 20 MIN: CPT

## 2021-07-15 NOTE — ASSESSMENT
[FreeTextEntry1] :  e k g shows nsr nonspecific t wave changes\par  echo is normal\par  stress echo no ischemia\par  pt is asymptomatic\par  cardiac status is normal\par  pt is cleared for bariatric surgery

## 2021-07-15 NOTE — REASON FOR VISIT
[Symptom and Test Evaluation] : symptom and test evaluation [Arrhythmia/ECG Abnorrmalities] : arrhythmia/ECG abnormalities [FreeTextEntry1] : preop clearence for bariatric surgery

## 2021-07-29 NOTE — OB PROVIDER H&P - NSHPROSALLOTHERNEGRD_GEN_ALL_CORE
HLD (hyperlipidemia)    HTN (hypertension)    
All other review of systems negative, except as noted in HPI

## 2021-08-18 ENCOUNTER — NON-APPOINTMENT (OUTPATIENT)
Age: 36
End: 2021-08-18

## 2021-08-20 NOTE — ED PROVIDER NOTE - SECONDARY DIAGNOSIS.
sertraline (ZOLOFT) 100 MG tablet last renewed on 7/27/21 for 30 day supply with 2 refills.  Refill request denied, not due for renewal at this time.     
Bronchitis

## 2021-08-30 ENCOUNTER — NON-APPOINTMENT (OUTPATIENT)
Age: 36
End: 2021-08-30

## 2021-11-01 ENCOUNTER — OUTPATIENT (OUTPATIENT)
Dept: OUTPATIENT SERVICES | Facility: HOSPITAL | Age: 36
LOS: 1 days | Discharge: HOME | End: 2021-11-01
Payer: MEDICAID

## 2021-11-01 VITALS
OXYGEN SATURATION: 100 % | TEMPERATURE: 98 F | HEART RATE: 94 BPM | SYSTOLIC BLOOD PRESSURE: 142 MMHG | WEIGHT: 209.44 LBS | DIASTOLIC BLOOD PRESSURE: 78 MMHG | RESPIRATION RATE: 18 BRPM

## 2021-11-01 DIAGNOSIS — Z98.890 OTHER SPECIFIED POSTPROCEDURAL STATES: Chronic | ICD-10-CM

## 2021-11-01 DIAGNOSIS — E66.01 MORBID (SEVERE) OBESITY DUE TO EXCESS CALORIES: ICD-10-CM

## 2021-11-01 DIAGNOSIS — Z01.818 ENCOUNTER FOR OTHER PREPROCEDURAL EXAMINATION: ICD-10-CM

## 2021-11-01 DIAGNOSIS — Z98.51 TUBAL LIGATION STATUS: Chronic | ICD-10-CM

## 2021-11-01 LAB
A1C WITH ESTIMATED AVERAGE GLUCOSE RESULT: 5.7 % — HIGH (ref 4–5.6)
ALBUMIN SERPL ELPH-MCNC: 4.6 G/DL — SIGNIFICANT CHANGE UP (ref 3.5–5.2)
ALP SERPL-CCNC: 89 U/L — SIGNIFICANT CHANGE UP (ref 30–115)
ALT FLD-CCNC: 16 U/L — SIGNIFICANT CHANGE UP (ref 0–41)
ANION GAP SERPL CALC-SCNC: 16 MMOL/L — HIGH (ref 7–14)
APTT BLD: 33.2 SEC — SIGNIFICANT CHANGE UP (ref 27–39.2)
AST SERPL-CCNC: 12 U/L — SIGNIFICANT CHANGE UP (ref 0–41)
BASOPHILS # BLD AUTO: 0.08 K/UL — SIGNIFICANT CHANGE UP (ref 0–0.2)
BASOPHILS NFR BLD AUTO: 0.7 % — SIGNIFICANT CHANGE UP (ref 0–1)
BILIRUB SERPL-MCNC: 0.2 MG/DL — SIGNIFICANT CHANGE UP (ref 0.2–1.2)
BLD GP AB SCN SERPL QL: SIGNIFICANT CHANGE UP
BUN SERPL-MCNC: 20 MG/DL — SIGNIFICANT CHANGE UP (ref 10–20)
CALCIUM SERPL-MCNC: 9.9 MG/DL — SIGNIFICANT CHANGE UP (ref 8.5–10.1)
CHLORIDE SERPL-SCNC: 102 MMOL/L — SIGNIFICANT CHANGE UP (ref 98–110)
CO2 SERPL-SCNC: 19 MMOL/L — SIGNIFICANT CHANGE UP (ref 17–32)
CREAT SERPL-MCNC: 0.5 MG/DL — LOW (ref 0.7–1.5)
EOSINOPHIL # BLD AUTO: 0.19 K/UL — SIGNIFICANT CHANGE UP (ref 0–0.7)
EOSINOPHIL NFR BLD AUTO: 1.7 % — SIGNIFICANT CHANGE UP (ref 0–8)
ESTIMATED AVERAGE GLUCOSE: 117 MG/DL — HIGH (ref 68–114)
GLUCOSE SERPL-MCNC: 85 MG/DL — SIGNIFICANT CHANGE UP (ref 70–99)
HCT VFR BLD CALC: 38.3 % — SIGNIFICANT CHANGE UP (ref 37–47)
HGB BLD-MCNC: 12.4 G/DL — SIGNIFICANT CHANGE UP (ref 12–16)
IMM GRANULOCYTES NFR BLD AUTO: 0.4 % — HIGH (ref 0.1–0.3)
INR BLD: 0.97 RATIO — SIGNIFICANT CHANGE UP (ref 0.65–1.3)
LYMPHOCYTES # BLD AUTO: 3.64 K/UL — HIGH (ref 1.2–3.4)
LYMPHOCYTES # BLD AUTO: 32.6 % — SIGNIFICANT CHANGE UP (ref 20.5–51.1)
MCHC RBC-ENTMCNC: 26.7 PG — LOW (ref 27–31)
MCHC RBC-ENTMCNC: 32.4 G/DL — SIGNIFICANT CHANGE UP (ref 32–37)
MCV RBC AUTO: 82.5 FL — SIGNIFICANT CHANGE UP (ref 81–99)
MONOCYTES # BLD AUTO: 0.71 K/UL — HIGH (ref 0.1–0.6)
MONOCYTES NFR BLD AUTO: 6.4 % — SIGNIFICANT CHANGE UP (ref 1.7–9.3)
NEUTROPHILS # BLD AUTO: 6.51 K/UL — HIGH (ref 1.4–6.5)
NEUTROPHILS NFR BLD AUTO: 58.2 % — SIGNIFICANT CHANGE UP (ref 42.2–75.2)
NRBC # BLD: 0 /100 WBCS — SIGNIFICANT CHANGE UP (ref 0–0)
PLATELET # BLD AUTO: 316 K/UL — SIGNIFICANT CHANGE UP (ref 130–400)
POTASSIUM SERPL-MCNC: 4.3 MMOL/L — SIGNIFICANT CHANGE UP (ref 3.5–5)
POTASSIUM SERPL-SCNC: 4.3 MMOL/L — SIGNIFICANT CHANGE UP (ref 3.5–5)
PROT SERPL-MCNC: 7.7 G/DL — SIGNIFICANT CHANGE UP (ref 6–8)
PROTHROM AB SERPL-ACNC: 11.2 SEC — SIGNIFICANT CHANGE UP (ref 9.95–12.87)
RBC # BLD: 4.64 M/UL — SIGNIFICANT CHANGE UP (ref 4.2–5.4)
RBC # FLD: 15 % — HIGH (ref 11.5–14.5)
SODIUM SERPL-SCNC: 137 MMOL/L — SIGNIFICANT CHANGE UP (ref 135–146)
WBC # BLD: 11.17 K/UL — HIGH (ref 4.8–10.8)
WBC # FLD AUTO: 11.17 K/UL — HIGH (ref 4.8–10.8)

## 2021-11-01 PROCEDURE — 93010 ELECTROCARDIOGRAM REPORT: CPT

## 2021-11-01 RX ORDER — ALBUTEROL 90 UG/1
2 AEROSOL, METERED ORAL
Qty: 0 | Refills: 0 | DISCHARGE

## 2021-11-01 NOTE — H&P PST ADULT - NS NEC GEN PE MLT EXAM PC
Bedside report done, patient in bed with FOB @ bedside. Denies pain @ this time. Will be medicated as needed.    No bruits; no thyromegaly or nodules

## 2021-11-01 NOTE — H&P PST ADULT - DOES PATIENT HAVE ADVANCE DIRECTIVE
Abdirizak RN: Patient received in room #6 c/o lower abdominal cramping [id number]. A&Ox3, ambulatory. Patient reports LMP June 2nd, patient reports having a miscarriage in May reports her period "has been irregular since". Patient denies any birth controls, or spotting. Patient states she took 6 home pregnancy tests and all reported negative. Patient appears in nad, respirations even and unlabored. Report given to primary RN Storm. Will monitor. info provided

## 2021-11-01 NOTE — H&P PST ADULT - NSICDXPASTMEDICALHX_GEN_ALL_CORE_FT
PAST MEDICAL HISTORY:  Asthma     Carpal tunnel syndrome during pregnancy     Seasonal allergies

## 2021-11-01 NOTE — H&P PST ADULT - HISTORY OF PRESENT ILLNESS
Patient is a  36 year old female presenting to PAST in preparation for Laparoscopic sleeve gastrectomy  possible hiatal hernia repair, possible open, possible intraoperative endoscopy and all indicated procedures on 11/15 under gen anesthesia by Dr. Patel  reports no c/o cp,sob,palpitations,cough or dysuria  1-2 fos without sob    Pt reports she had COVID -19 in April 2021 Presently patient denies any signs or symptoms of COVID 19 and denies contact with known positive individuals.  She is fully vaccinated had the Moderna vaccine4/23/21. They have an appointment for repeat COVID testing pre-procedure and acknowledge its time and place.  They were instructed to quarantine pre-procedure, practice exposure control measures, continue to self-monitor and report any concerns to their proceduralist.    Anesthesia Alert  NO--Difficult Airway  NO--History of neck surgery or radiation  NO--Limited ROM of neck  NO--History of Malignant hyperthermia  NO--Personal or family history of Pseudocholinesterase deficiency  NO--Prior Anesthesia Complication  NO--Latex Allergy  NO--Loose teeth  NO--History of Rheumatoid Arthritis  yes--LEXIE( does not use c-pap)  NO-- BLEEDING RISK  NO--Other_____    As per patient, this is their complete medical and surgical history, including medications both prescribed or over the counter.  Patient verbalized understanding of instructions and was given the opportunity to ask questions and have them answered.

## 2021-11-05 RX ORDER — MICONAZOLE NITRATE 4 %
2 CREAM WITH PREFILLED APPLICATOR VAGINAL
Qty: 1 | Refills: 0 | Status: DISCONTINUED | COMMUNITY
Start: 2019-05-03 | End: 2021-11-05

## 2021-11-05 RX ORDER — ALBUTEROL SULFATE 2.5 MG/3ML
(2.5 MG/3ML) SOLUTION RESPIRATORY (INHALATION)
Qty: 300 | Refills: 0 | Status: DISCONTINUED | COMMUNITY
Start: 2019-03-30 | End: 2021-11-05

## 2021-11-05 RX ORDER — IPRATROPIUM BROMIDE AND ALBUTEROL 20; 100 UG/1; UG/1
20-100 SPRAY, METERED RESPIRATORY (INHALATION)
Qty: 4 | Refills: 0 | Status: DISCONTINUED | COMMUNITY
Start: 2020-12-24 | End: 2021-11-05

## 2021-11-05 RX ORDER — ERGOCALCIFEROL 1.25 MG/1
1.25 MG CAPSULE ORAL
Qty: 12 | Refills: 0 | Status: DISCONTINUED | COMMUNITY
Start: 2021-05-06 | End: 2021-11-05

## 2021-11-05 RX ORDER — ERGOCALCIFEROL 1.25 MG/1
1.25 MG CAPSULE ORAL
Qty: 8 | Refills: 0 | Status: DISCONTINUED | COMMUNITY
Start: 2021-02-22 | End: 2021-11-05

## 2021-11-05 RX ORDER — OMEPRAZOLE 40 MG/1
40 CAPSULE, DELAYED RELEASE ORAL
Qty: 30 | Refills: 2 | Status: COMPLETED | COMMUNITY
Start: 2021-11-05 | End: 2022-02-11

## 2021-11-05 RX ORDER — FLUTICASONE PROPIONATE 110 UG/1
110 AEROSOL, METERED RESPIRATORY (INHALATION)
Qty: 12 | Refills: 0 | Status: DISCONTINUED | COMMUNITY
Start: 2019-03-30 | End: 2021-11-05

## 2021-11-05 RX ORDER — FLUTICASONE PROPIONATE 50 UG/1
50 SPRAY, METERED NASAL DAILY
Qty: 1 | Refills: 2 | Status: DISCONTINUED | COMMUNITY
Start: 2021-05-07 | End: 2021-11-05

## 2021-11-10 ENCOUNTER — APPOINTMENT (OUTPATIENT)
Dept: SURGERY | Facility: CLINIC | Age: 36
End: 2021-11-10
Payer: MEDICAID

## 2021-11-10 VITALS
OXYGEN SATURATION: 94 % | TEMPERATURE: 97.6 F | BODY MASS INDEX: 39.27 KG/M2 | HEIGHT: 61 IN | WEIGHT: 208 LBS | SYSTOLIC BLOOD PRESSURE: 122 MMHG | DIASTOLIC BLOOD PRESSURE: 68 MMHG | HEART RATE: 89 BPM

## 2021-11-10 DIAGNOSIS — R79.89 OTHER SPECIFIED ABNORMAL FINDINGS OF BLOOD CHEMISTRY: ICD-10-CM

## 2021-11-10 PROCEDURE — 99213 OFFICE O/P EST LOW 20 MIN: CPT

## 2021-11-10 PROCEDURE — 99072 ADDL SUPL MATRL&STAF TM PHE: CPT

## 2021-11-10 NOTE — PLAN
[FreeTextEntry1] : Discussed with patient the risks and benefits of surgery.  The patient understands and wishes to undergo the proposed surgery.  Patient had no additional questions.  History and physical and all testing was reviewed.  The consent was signed.  PATs were reviewed.\par

## 2021-11-10 NOTE — HISTORY OF PRESENT ILLNESS
[de-identified] : Gypsy is here for a preoperative visit as she prepares for her sleeve gastrectomy on 11/15/2021. She has completed all of her preoperative evlauations.

## 2021-11-12 ENCOUNTER — OUTPATIENT (OUTPATIENT)
Dept: OUTPATIENT SERVICES | Facility: HOSPITAL | Age: 36
LOS: 1 days | Discharge: HOME | End: 2021-11-12

## 2021-11-12 ENCOUNTER — NON-APPOINTMENT (OUTPATIENT)
Age: 36
End: 2021-11-12

## 2021-11-12 ENCOUNTER — LABORATORY RESULT (OUTPATIENT)
Age: 36
End: 2021-11-12

## 2021-11-12 DIAGNOSIS — Z11.59 ENCOUNTER FOR SCREENING FOR OTHER VIRAL DISEASES: ICD-10-CM

## 2021-11-12 DIAGNOSIS — Z98.51 TUBAL LIGATION STATUS: Chronic | ICD-10-CM

## 2021-11-12 PROBLEM — J30.2 OTHER SEASONAL ALLERGIC RHINITIS: Chronic | Status: ACTIVE | Noted: 2021-11-01

## 2021-11-15 ENCOUNTER — APPOINTMENT (OUTPATIENT)
Dept: SURGERY | Facility: CLINIC | Age: 36
End: 2021-11-15

## 2021-11-15 ENCOUNTER — RESULT REVIEW (OUTPATIENT)
Age: 36
End: 2021-11-15

## 2021-11-15 ENCOUNTER — INPATIENT (INPATIENT)
Facility: HOSPITAL | Age: 36
LOS: 0 days | Discharge: HOME | End: 2021-11-16
Attending: SURGERY | Admitting: SURGERY
Payer: MEDICAID

## 2021-11-15 VITALS
DIASTOLIC BLOOD PRESSURE: 83 MMHG | HEIGHT: 61 IN | OXYGEN SATURATION: 97 % | WEIGHT: 207.9 LBS | RESPIRATION RATE: 18 BRPM | TEMPERATURE: 98 F | HEART RATE: 93 BPM | SYSTOLIC BLOOD PRESSURE: 121 MMHG

## 2021-11-15 DIAGNOSIS — Z98.51 TUBAL LIGATION STATUS: Chronic | ICD-10-CM

## 2021-11-15 LAB
ANION GAP SERPL CALC-SCNC: 15 MMOL/L — HIGH (ref 7–14)
ANION GAP SERPL CALC-SCNC: 16 MMOL/L — HIGH (ref 7–14)
BUN SERPL-MCNC: 11 MG/DL — SIGNIFICANT CHANGE UP (ref 10–20)
BUN SERPL-MCNC: 12 MG/DL — SIGNIFICANT CHANGE UP (ref 10–20)
CALCIUM SERPL-MCNC: 8.6 MG/DL — SIGNIFICANT CHANGE UP (ref 8.5–10.1)
CALCIUM SERPL-MCNC: 8.9 MG/DL — SIGNIFICANT CHANGE UP (ref 8.5–10.1)
CHLORIDE SERPL-SCNC: 100 MMOL/L — SIGNIFICANT CHANGE UP (ref 98–110)
CHLORIDE SERPL-SCNC: 104 MMOL/L — SIGNIFICANT CHANGE UP (ref 98–110)
CK MB CFR SERPL CALC: 1.3 NG/ML — SIGNIFICANT CHANGE UP (ref 0.6–6.3)
CK MB CFR SERPL CALC: 1.7 NG/ML — SIGNIFICANT CHANGE UP (ref 0.6–6.3)
CK SERPL-CCNC: 61 U/L — SIGNIFICANT CHANGE UP (ref 0–225)
CK SERPL-CCNC: 82 U/L — SIGNIFICANT CHANGE UP (ref 0–225)
CO2 SERPL-SCNC: 18 MMOL/L — SIGNIFICANT CHANGE UP (ref 17–32)
CO2 SERPL-SCNC: 21 MMOL/L — SIGNIFICANT CHANGE UP (ref 17–32)
CREAT SERPL-MCNC: 0.5 MG/DL — LOW (ref 0.7–1.5)
CREAT SERPL-MCNC: 0.8 MG/DL — SIGNIFICANT CHANGE UP (ref 0.7–1.5)
GLUCOSE SERPL-MCNC: 108 MG/DL — HIGH (ref 70–99)
GLUCOSE SERPL-MCNC: 121 MG/DL — HIGH (ref 70–99)
HCT VFR BLD CALC: 40.6 % — SIGNIFICANT CHANGE UP (ref 37–47)
HGB BLD-MCNC: 12.9 G/DL — SIGNIFICANT CHANGE UP (ref 12–16)
MAGNESIUM SERPL-MCNC: 1.7 MG/DL — LOW (ref 1.8–2.4)
MAGNESIUM SERPL-MCNC: 2.4 MG/DL — SIGNIFICANT CHANGE UP (ref 1.8–2.4)
MCHC RBC-ENTMCNC: 26.9 PG — LOW (ref 27–31)
MCHC RBC-ENTMCNC: 31.8 G/DL — LOW (ref 32–37)
MCV RBC AUTO: 84.6 FL — SIGNIFICANT CHANGE UP (ref 81–99)
NRBC # BLD: 0 /100 WBCS — SIGNIFICANT CHANGE UP (ref 0–0)
PHOSPHATE SERPL-MCNC: 2.5 MG/DL — SIGNIFICANT CHANGE UP (ref 2.1–4.9)
PHOSPHATE SERPL-MCNC: 3.4 MG/DL — SIGNIFICANT CHANGE UP (ref 2.1–4.9)
PLATELET # BLD AUTO: 242 K/UL — SIGNIFICANT CHANGE UP (ref 130–400)
POTASSIUM SERPL-MCNC: 4 MMOL/L — SIGNIFICANT CHANGE UP (ref 3.5–5)
POTASSIUM SERPL-MCNC: 4.1 MMOL/L — SIGNIFICANT CHANGE UP (ref 3.5–5)
POTASSIUM SERPL-SCNC: 4 MMOL/L — SIGNIFICANT CHANGE UP (ref 3.5–5)
POTASSIUM SERPL-SCNC: 4.1 MMOL/L — SIGNIFICANT CHANGE UP (ref 3.5–5)
RBC # BLD: 4.8 M/UL — SIGNIFICANT CHANGE UP (ref 4.2–5.4)
RBC # FLD: 14.6 % — HIGH (ref 11.5–14.5)
SODIUM SERPL-SCNC: 133 MMOL/L — LOW (ref 135–146)
SODIUM SERPL-SCNC: 141 MMOL/L — SIGNIFICANT CHANGE UP (ref 135–146)
TROPONIN T SERPL-MCNC: <0.01 NG/ML — SIGNIFICANT CHANGE UP
TROPONIN T SERPL-MCNC: <0.01 NG/ML — SIGNIFICANT CHANGE UP
WBC # BLD: 9.09 K/UL — SIGNIFICANT CHANGE UP (ref 4.8–10.8)
WBC # FLD AUTO: 9.09 K/UL — SIGNIFICANT CHANGE UP (ref 4.8–10.8)

## 2021-11-15 PROCEDURE — 43775 LAP SLEEVE GASTRECTOMY: CPT

## 2021-11-15 PROCEDURE — 88305 TISSUE EXAM BY PATHOLOGIST: CPT | Mod: 26

## 2021-11-15 PROCEDURE — 93010 ELECTROCARDIOGRAM REPORT: CPT | Mod: 76

## 2021-11-15 PROCEDURE — 71045 X-RAY EXAM CHEST 1 VIEW: CPT | Mod: 26

## 2021-11-15 RX ORDER — HYDROMORPHONE HYDROCHLORIDE 2 MG/ML
1 INJECTION INTRAMUSCULAR; INTRAVENOUS; SUBCUTANEOUS
Refills: 0 | Status: DISCONTINUED | OUTPATIENT
Start: 2021-11-15 | End: 2021-11-15

## 2021-11-15 RX ORDER — INFLUENZA VIRUS VACCINE 15; 15; 15; 15 UG/.5ML; UG/.5ML; UG/.5ML; UG/.5ML
0.5 SUSPENSION INTRAMUSCULAR ONCE
Refills: 0 | Status: DISCONTINUED | OUTPATIENT
Start: 2021-11-15 | End: 2021-11-16

## 2021-11-15 RX ORDER — ENOXAPARIN SODIUM 100 MG/ML
40 INJECTION SUBCUTANEOUS DAILY
Refills: 0 | Status: DISCONTINUED | OUTPATIENT
Start: 2021-11-16 | End: 2021-11-16

## 2021-11-15 RX ORDER — ALBUTEROL 90 UG/1
2.5 AEROSOL, METERED ORAL ONCE
Refills: 0 | Status: DISCONTINUED | OUTPATIENT
Start: 2021-11-15 | End: 2021-11-16

## 2021-11-15 RX ORDER — GABAPENTIN 400 MG/1
100 CAPSULE ORAL THREE TIMES A DAY
Refills: 0 | Status: DISCONTINUED | OUTPATIENT
Start: 2021-11-15 | End: 2021-11-16

## 2021-11-15 RX ORDER — ONDANSETRON 8 MG/1
4 TABLET, FILM COATED ORAL EVERY 6 HOURS
Refills: 0 | Status: DISCONTINUED | OUTPATIENT
Start: 2021-11-15 | End: 2021-11-16

## 2021-11-15 RX ORDER — MAGNESIUM SULFATE 500 MG/ML
2 VIAL (ML) INJECTION ONCE
Refills: 0 | Status: COMPLETED | OUTPATIENT
Start: 2021-11-15 | End: 2021-11-15

## 2021-11-15 RX ORDER — ACETAMINOPHEN 500 MG
1000 TABLET ORAL ONCE
Refills: 0 | Status: DISCONTINUED | OUTPATIENT
Start: 2021-11-15 | End: 2021-11-16

## 2021-11-15 RX ORDER — ENOXAPARIN SODIUM 100 MG/ML
40 INJECTION SUBCUTANEOUS ONCE
Refills: 0 | Status: COMPLETED | OUTPATIENT
Start: 2021-11-15 | End: 2021-11-15

## 2021-11-15 RX ORDER — SODIUM CHLORIDE 9 MG/ML
1000 INJECTION, SOLUTION INTRAVENOUS
Refills: 0 | Status: DISCONTINUED | OUTPATIENT
Start: 2021-11-15 | End: 2021-11-16

## 2021-11-15 RX ORDER — FAMOTIDINE 10 MG/ML
20 INJECTION INTRAVENOUS ONCE
Refills: 0 | Status: COMPLETED | OUTPATIENT
Start: 2021-11-15 | End: 2021-11-15

## 2021-11-15 RX ORDER — ACETAMINOPHEN 500 MG
1000 TABLET ORAL ONCE
Refills: 0 | Status: COMPLETED | OUTPATIENT
Start: 2021-11-15 | End: 2021-11-15

## 2021-11-15 RX ORDER — ALBUTEROL 90 UG/1
2 AEROSOL, METERED ORAL EVERY 6 HOURS
Refills: 0 | Status: DISCONTINUED | OUTPATIENT
Start: 2021-11-15 | End: 2021-11-16

## 2021-11-15 RX ORDER — HYDROMORPHONE HYDROCHLORIDE 2 MG/ML
0.5 INJECTION INTRAMUSCULAR; INTRAVENOUS; SUBCUTANEOUS EVERY 6 HOURS
Refills: 0 | Status: DISCONTINUED | OUTPATIENT
Start: 2021-11-15 | End: 2021-11-16

## 2021-11-15 RX ORDER — KETOROLAC TROMETHAMINE 30 MG/ML
15 SYRINGE (ML) INJECTION EVERY 8 HOURS
Refills: 0 | Status: DISCONTINUED | OUTPATIENT
Start: 2021-11-15 | End: 2021-11-16

## 2021-11-15 RX ORDER — PANTOPRAZOLE SODIUM 20 MG/1
40 TABLET, DELAYED RELEASE ORAL DAILY
Refills: 0 | Status: DISCONTINUED | OUTPATIENT
Start: 2021-11-16 | End: 2021-11-16

## 2021-11-15 RX ORDER — HYDROMORPHONE HYDROCHLORIDE 2 MG/ML
0.5 INJECTION INTRAMUSCULAR; INTRAVENOUS; SUBCUTANEOUS ONCE
Refills: 0 | Status: DISCONTINUED | OUTPATIENT
Start: 2021-11-15 | End: 2021-11-15

## 2021-11-15 RX ORDER — BUPIVACAINE 13.3 MG/ML
20 INJECTION, SUSPENSION, LIPOSOMAL INFILTRATION ONCE
Refills: 0 | Status: DISCONTINUED | OUTPATIENT
Start: 2021-11-15 | End: 2021-11-15

## 2021-11-15 RX ORDER — MONTELUKAST 4 MG/1
10 TABLET, CHEWABLE ORAL DAILY
Refills: 0 | Status: DISCONTINUED | OUTPATIENT
Start: 2021-11-16 | End: 2021-11-16

## 2021-11-15 RX ORDER — MEPERIDINE HYDROCHLORIDE 50 MG/ML
12.5 INJECTION INTRAMUSCULAR; INTRAVENOUS; SUBCUTANEOUS
Refills: 0 | Status: DISCONTINUED | OUTPATIENT
Start: 2021-11-15 | End: 2021-11-15

## 2021-11-15 RX ORDER — HYDROMORPHONE HYDROCHLORIDE 2 MG/ML
0.5 INJECTION INTRAMUSCULAR; INTRAVENOUS; SUBCUTANEOUS
Refills: 0 | Status: DISCONTINUED | OUTPATIENT
Start: 2021-11-15 | End: 2021-11-15

## 2021-11-15 RX ORDER — ONDANSETRON 8 MG/1
4 TABLET, FILM COATED ORAL ONCE
Refills: 0 | Status: DISCONTINUED | OUTPATIENT
Start: 2021-11-15 | End: 2021-11-15

## 2021-11-15 RX ORDER — SODIUM CHLORIDE 9 MG/ML
1000 INJECTION, SOLUTION INTRAVENOUS
Refills: 0 | Status: DISCONTINUED | OUTPATIENT
Start: 2021-11-15 | End: 2021-11-15

## 2021-11-15 RX ADMIN — SODIUM CHLORIDE 125 MILLILITER(S): 9 INJECTION, SOLUTION INTRAVENOUS at 11:02

## 2021-11-15 RX ADMIN — HYDROMORPHONE HYDROCHLORIDE 0.5 MILLIGRAM(S): 2 INJECTION INTRAMUSCULAR; INTRAVENOUS; SUBCUTANEOUS at 12:38

## 2021-11-15 RX ADMIN — Medication 15 MILLIGRAM(S): at 17:42

## 2021-11-15 RX ADMIN — Medication 25 GRAM(S): at 12:06

## 2021-11-15 RX ADMIN — FAMOTIDINE 20 MILLIGRAM(S): 10 INJECTION INTRAVENOUS at 07:25

## 2021-11-15 RX ADMIN — Medication 400 MILLIGRAM(S): at 08:35

## 2021-11-15 RX ADMIN — Medication 62.5 MILLIMOLE(S): at 15:06

## 2021-11-15 RX ADMIN — Medication 15 MILLIGRAM(S): at 18:00

## 2021-11-15 RX ADMIN — SODIUM CHLORIDE 125 MILLILITER(S): 9 INJECTION, SOLUTION INTRAVENOUS at 23:59

## 2021-11-15 RX ADMIN — Medication 1000 MILLIGRAM(S): at 13:40

## 2021-11-15 RX ADMIN — ONDANSETRON 4 MILLIGRAM(S): 8 TABLET, FILM COATED ORAL at 22:17

## 2021-11-15 RX ADMIN — HYDROMORPHONE HYDROCHLORIDE 0.5 MILLIGRAM(S): 2 INJECTION INTRAMUSCULAR; INTRAVENOUS; SUBCUTANEOUS at 22:39

## 2021-11-15 RX ADMIN — SODIUM CHLORIDE 125 MILLILITER(S): 9 INJECTION, SOLUTION INTRAVENOUS at 15:01

## 2021-11-15 RX ADMIN — HYDROMORPHONE HYDROCHLORIDE 0.5 MILLIGRAM(S): 2 INJECTION INTRAMUSCULAR; INTRAVENOUS; SUBCUTANEOUS at 22:23

## 2021-11-15 RX ADMIN — HYDROMORPHONE HYDROCHLORIDE 0.5 MILLIGRAM(S): 2 INJECTION INTRAMUSCULAR; INTRAVENOUS; SUBCUTANEOUS at 13:40

## 2021-11-15 RX ADMIN — ENOXAPARIN SODIUM 40 MILLIGRAM(S): 100 INJECTION SUBCUTANEOUS at 07:25

## 2021-11-15 NOTE — BRIEF OPERATIVE NOTE - NSICDXBRIEFPREOP_GEN_ALL_CORE_FT
PRE-OP DIAGNOSIS:  Class 2 severe obesity with serious comorbidity in adult 15-Nov-2021 09:38:33  Tiffany Diez  LEXIE (obstructive sleep apnea) 15-Nov-2021 09:38:43  Tiffany Diez

## 2021-11-15 NOTE — BRIEF OPERATIVE NOTE - NSICDXBRIEFPOSTOP_GEN_ALL_CORE_FT
POST-OP DIAGNOSIS:  Class 2 severe obesity with serious comorbidity in adult 15-Nov-2021 09:38:54  Tiffany Diez  LEXIE (obstructive sleep apnea) 15-Nov-2021 09:39:02  Tiffany Diez

## 2021-11-15 NOTE — BRIEF OPERATIVE NOTE - NSICDXBRIEFPROCEDURE_GEN_ALL_CORE_FT
PROCEDURES:  Laparoscopic sleeve gastrectomy 15-Nov-2021 09:38:01  Tiffany Diez, transversus abdominis plane, bilateral 15-Nov-2021 09:38:09  Tiffany Diez  Upper endoscopy 15-Nov-2021 09:38:19  Tiffany Diez

## 2021-11-15 NOTE — PATIENT PROFILE ADULT - NSPRONUTRITIONRISK_GEN_A_NUR
"Annual exam    Dinesh Ward is a 13year old female who presents for  well child exam.  Patient presents with Father and and sibling(s). Concerns raised today include: none    Social Hx:   School: [employer] / Activities: Sketch and draw, hangs out with friends  Future Plans: wants to go to [facility]  Tobacco: denies  EtOH: denies  Illicit drugs or Homeopathic medicines: denies  Family Hx: negative for athletic cardiac events    PAST HX:  Past medical, surgical, and family histories reviewed and updated. ROS:  Gen: Feeling well, no fever/chills, recent rapid wt changes  Derm: No new or changes in existing moles or lesions. No rashes. Neuro: No dizziness, no HAs, numbness/tingling  Resp: No uri sx, no cough, wheeze, SOB  Nodes: No noted lymphadenopathy, swollen glands  Cardiac: No chest pain, palpitations, skipped beats. No prior hx of murmur. No hx of syncope. GI: No emesis, diarrhea, constipation, or blood in stools  : No polyuria or dysuria, urgency, or frequency  Menstrual: LMP few weeks ago. No dysmenorrhea. MS: No joint swelling/warmth, no recent injuries  Hemat: No easy bruising or bleeding  Psych: No depressive symptoms    PHYSICAL EXAM:  Blood pressure 98/64, pulse 78, temperature 98.6 Â°F (37 Â°C), temperature source Oral, resp. rate 17, height 5' 7.52"" (1.715 m), weight 55.9 kg.  Gen: Well appearing female, no acute distress  Derm: No lesions or rashes noted  HEENT: PERRL, EOMI, no conjunctival injection. No scleral icterus. TM with normal landmarks bilaterally. Oropharynx with moist mucus membranes, clear. Neck: supple  Nodes: no adenopathy  Lungs: Clear to auscultation. No wheezes, rales, rhonchi. Normal work of breathing. Cardiac: Regular rate and rhythm, normal S1S2, no murmur appreciated. Abdomen: Soft, nontender, nondistended. Normoactive bowel sounds. No organomegaly or masses. Extremities: Full ROM UE/LE. Warm, well perfused.  No clubbing/cyanosis/edema  Neuro: Grossly " intact. Strength 5/5 bilaterally. Normal tone. Gait nonataxic, toe/heelwalking intact. ASSESSMENT: Well 13year old female adolescent. BEHAVIOR/GUIDANCE:      Tobacco, ETOH, drug avoidance - DISCUSSED. Reviewed. She has no concerns for drug use      Sexual activity & avoidance / safe sex - DISCUSSED. She denies sexual activity      Puberty / menstruation - DISCUSSED      Accident prevention - DISCUSSED.  Reviewed car safety as well      School achievement - DISCUSSED      Family/Peer relationships - DISCUSSED      Regular physical activity - DISCUSSED      Healthy food choices - DISCUSSED    PLAN:  Anticipatory guidance sheet   Immunizations per orders: up to date  RV in 1  year for WCE, prn illness/concerns Bariatric surgery

## 2021-11-15 NOTE — ASU PREOP CHECKLIST - SIDE RAILS UP
REASON FOR VISIT:  Post op follow up    6/5/2020  PROCEDURE PERFORMED:  1.  Repair of radial digital nerve of the left ring finger  2.  Closure of left ring finger wound that measured 2.5 cm      INTERVAL EVENTS:  Patient states she believes she is healing well. Incisions have healed. She attended hand therapy and reports full range of motion with occasional stiffness and swelling. She is starting to have sensation over the incision site, but still notes diminished sensation along the radial aspect of the finger, distal to incision.     EXAM:  Left ring finger- Incision has healed well. Scar soft. Subtle swelling of the proximal finger. Patient demonstrates full range of motion. She is able to make a complete composite fist.     ASSESSMENT:  Good recovery post repair of radial digital nerve and closure of wound left ring finger    PLAN:  Patient given reassurance  No restrictions, patient may submerge hand in pool/ lake water  Follow up as needed      
done

## 2021-11-15 NOTE — CONSULT NOTE ADULT - ATTENDING COMMENTS
dehydration   continue IV resuscitation   NPO, NGT   IV antibiotics   monitor U/o   admit to SDU   d/w SICU Team

## 2021-11-15 NOTE — CONSULT NOTE ADULT - ASSESSMENT
Assessment & Plan    36y Female  s/p lap sleeve gastrectomy    NEURO:    Acute pain-controlled with   Inpatient Rx: acetaminophen   IVPB .. 1000 milliGRAM(s) IV Intermittent once PRN  gabapentin Solution 100 milliGRAM(s) Oral three times a day PRN  ketorolac   Injectable 15 milliGRAM(s) IV Push every 8 hours PRN  ondansetron Injectable 4 milliGRAM(s) IV Push every 6 hours PRN    RESP:     Oxygenation- extubated post op, satting well on RA; encourage IC;  f/u AM CXR  history of asthma and LEXIE, continue home singulair and albuterol    CARDS:   No history, monitor for tachycardia  EKG showing NSR with TWI in leads I and II  QTc: 484  f/u cardiac enzymes x3    GI/NUTR:     Diet, Clear Liquid:   Bariatric Clear Liquid (BARICLLIQ) (11-15-21 @ 09:48) [Active]      GI Prophylaxis- PPI    Bowel regimen- holding       Inpatient Rx:     /RENAL:        Monitor UO- no slade, TOCHRISTINA 16:00  LR @ 125 mL/hr    Labs:          BUN/Cr- 12/0.8  -->          Electrolytes-Na 141 // K 4.1 // Mg 1.7 //  Phos 2.5 (11-15 @ 10:30)    hypophosphatemia and hypomagnesemia repleated    HEME/ONC:       DVT prophylaxis-, SCDs, lovenox 40 daily     Labs: Hb/Hct:  12.9/40.6  -->                      Plts:  242  -->                 PTT/INR:          ID:  WBC- 9.09  --->>  Temp trend- 24hrs T(F): 97.7 (11-15 @ 09:35), Max: 97.7 (11-15 @ 09:35)    ENDO:     Glucose, Serum: 108 (11-15 @ 10:30)       HA1C - 5.7 on pre op testing  Fingersticks q6    LINES/DRAINS:  PIV    Advanced Directives: Presumed Full Code    HCP/Emergency Contact-    DISPO:    SICU discussed with attending Dr. Grier Assessment & Plan    36y Female  s/p lap sleeve gastrectomy    NEURO:    Acute pain-controlled with   Inpatient Rx: acetaminophen   IVPB .. 1000 milliGRAM(s) IV Intermittent once PRN  gabapentin Solution 100 milliGRAM(s) Oral three times a day PRN  ketorolac   Injectable 15 milliGRAM(s) IV Push every 8 hours PRN  ondansetron Injectable 4 milliGRAM(s) IV Push every 6 hours PRN    RESP:     Oxygenation- extubated post op, satting well on RA; encourage IC;  f/u AM CXR  history of asthma and LEXIE, continue home singulair and albuterol    CARDS:   No history, monitor for tachycardia  EKG showing NSR with TWI in leads I and II  QTc: 484  trop negative x1, f/u AM trop    GI/NUTR:     Diet, Clear Liquid:   Bariatric Clear Liquid (BARICLLIQ) (11-15-21 @ 09:48) [Active]      GI Prophylaxis- PPI    Bowel regimen- holding       Inpatient Rx:     /RENAL:        Monitor UO- no slade, TOCHRISTINA 16:00  LR @ 125 mL/hr    Labs:          BUN/Cr- 12/0.8  -->          Electrolytes-Na 141 // K 4.1 // Mg 1.7 //  Phos 2.5 (11-15 @ 10:30)    hypophosphatemia and hypomagnesemia repleated    HEME/ONC:       DVT prophylaxis-, SCDs, lovenox 40 daily     Labs: Hb/Hct:  12.9/40.6  -->                      Plts:  242  -->                 PTT/INR:          ID:  WBC- 9.09  --->>  Temp trend- 24hrs T(F): 97.7 (11-15 @ 09:35), Max: 97.7 (11-15 @ 09:35)    ENDO:     Glucose, Serum: 108 (11-15 @ 10:30)       HA1C - 5.7 on pre op testing  Fingersticks q6    LINES/DRAINS:  PIV    Advanced Directives: Presumed Full Code    HCP/Emergency Contact-    DISPO:    SICU discussed with attending Dr. Grier Assessment & Plan    36y Female  s/p lap sleeve gastrectomy    NEURO:    Acute pain-controlled with   Inpatient Rx: acetaminophen   IVPB .. 1000 milliGRAM(s) IV Intermittent once PRN  gabapentin Solution 100 milliGRAM(s) Oral three times a day PRN  ketorolac   Injectable 15 milliGRAM(s) IV Push every 8 hours PRN  ondansetron Injectable 4 milliGRAM(s) IV Push every 6 hours PRN    RESP:     Oxygenation- extubated post op, satting well on RA; encourage IC;  f/u AM CXR  history of asthma and LEXIE, continue home singulair and albuterol    CARDS:   No history, monitor for tachycardia  EKG showing NSR with TWI in leads I and II  QTc: 484  trop negative x1, f/u 16:00, 23:30 trop    GI/NUTR:     Diet, Clear Liquid:   Bariatric Clear Liquid (BARICLLIQ) (11-15-21 @ 09:48) [Active]      GI Prophylaxis- PPI    Bowel regimen- holding       Inpatient Rx:     /RENAL:        Monitor UO- no [place] TOCHRISTINA 16:00  LR @ 125 mL/hr    Labs:          BUN/Cr- 12/0.8  -->          Electrolytes-Na 141 // K 4.1 // Mg 1.7 //  Phos 2.5 (11-15 @ 10:30)    hypophosphatemia and hypomagnesemia repleated    HEME/ONC:       DVT prophylaxis-, SCDs, lovenox 40 daily     Labs: Hb/Hct:  12.9/40.6  -->                      Plts:  242  -->                 PTT/INR:          ID:  WBC- 9.09  --->>  Temp trend- 24hrs T(F): 97.7 (11-15 @ 09:35), Max: 97.7 (11-15 @ 09:35)    ENDO:     Glucose, Serum: 108 (11-15 @ 10:30)       HA1C - 5.7 on pre op testing  Fingersticks q6    LINES/DRAINS:  PIV    Advanced Directives: Presumed Full Code    HCP/Emergency Contact-    DISPO:    SICU discussed with attending Dr. Grier

## 2021-11-15 NOTE — PATIENT PROFILE ADULT - NSPROPOAURINARYCATHETER_GEN_A_NUR
PATIENT CALLED REQUESTING A REFILL ON  - verapamil SR (CALAN-SR) 120 MG CR tablet    PATIENT IS COMPLETELY OUT OF THIS MEDICATION.    PHARMACY: [facility] - [place], [place] - [address] - [phone] [facility] [phone]   [phone]    PATIENT'S CALLBACK NUMBER: [phone]   no

## 2021-11-16 ENCOUNTER — TRANSCRIPTION ENCOUNTER (OUTPATIENT)
Age: 36
End: 2021-11-16

## 2021-11-16 VITALS
SYSTOLIC BLOOD PRESSURE: 121 MMHG | RESPIRATION RATE: 18 BRPM | DIASTOLIC BLOOD PRESSURE: 72 MMHG | HEART RATE: 105 BPM | OXYGEN SATURATION: 95 %

## 2021-11-16 LAB
ANION GAP SERPL CALC-SCNC: 17 MMOL/L — HIGH (ref 7–14)
BASOPHILS # BLD AUTO: 0.03 K/UL — SIGNIFICANT CHANGE UP (ref 0–0.2)
BASOPHILS NFR BLD AUTO: 0.2 % — SIGNIFICANT CHANGE UP (ref 0–1)
BUN SERPL-MCNC: 9 MG/DL — LOW (ref 10–20)
CALCIUM SERPL-MCNC: 8.5 MG/DL — SIGNIFICANT CHANGE UP (ref 8.5–10.1)
CHLORIDE SERPL-SCNC: 103 MMOL/L — SIGNIFICANT CHANGE UP (ref 98–110)
CK MB CFR SERPL CALC: 1.6 NG/ML — SIGNIFICANT CHANGE UP (ref 0.6–6.3)
CO2 SERPL-SCNC: 17 MMOL/L — SIGNIFICANT CHANGE UP (ref 17–32)
COVID-19 SPIKE DOMAIN AB INTERP: POSITIVE
COVID-19 SPIKE DOMAIN ANTIBODY RESULT: >250 U/ML — HIGH
CREAT SERPL-MCNC: 0.5 MG/DL — LOW (ref 0.7–1.5)
EOSINOPHIL # BLD AUTO: 0.01 K/UL — SIGNIFICANT CHANGE UP (ref 0–0.7)
EOSINOPHIL NFR BLD AUTO: 0.1 % — SIGNIFICANT CHANGE UP (ref 0–8)
GLUCOSE SERPL-MCNC: 88 MG/DL — SIGNIFICANT CHANGE UP (ref 70–99)
HCT VFR BLD CALC: 34.5 % — LOW (ref 37–47)
HCT VFR BLD CALC: 35.7 % — LOW (ref 37–47)
HGB BLD-MCNC: 11.1 G/DL — LOW (ref 12–16)
HGB BLD-MCNC: 11.4 G/DL — LOW (ref 12–16)
IMM GRANULOCYTES NFR BLD AUTO: 0.4 % — HIGH (ref 0.1–0.3)
LYMPHOCYTES # BLD AUTO: 27.1 % — SIGNIFICANT CHANGE UP (ref 20.5–51.1)
LYMPHOCYTES # BLD AUTO: 3.36 K/UL — SIGNIFICANT CHANGE UP (ref 1.2–3.4)
MAGNESIUM SERPL-MCNC: 2.1 MG/DL — SIGNIFICANT CHANGE UP (ref 1.8–2.4)
MCHC RBC-ENTMCNC: 26.6 PG — LOW (ref 27–31)
MCHC RBC-ENTMCNC: 26.6 PG — LOW (ref 27–31)
MCHC RBC-ENTMCNC: 31.9 G/DL — LOW (ref 32–37)
MCHC RBC-ENTMCNC: 32.2 G/DL — SIGNIFICANT CHANGE UP (ref 32–37)
MCV RBC AUTO: 82.7 FL — SIGNIFICANT CHANGE UP (ref 81–99)
MCV RBC AUTO: 83.4 FL — SIGNIFICANT CHANGE UP (ref 81–99)
MONOCYTES # BLD AUTO: 0.86 K/UL — HIGH (ref 0.1–0.6)
MONOCYTES NFR BLD AUTO: 6.9 % — SIGNIFICANT CHANGE UP (ref 1.7–9.3)
NEUTROPHILS # BLD AUTO: 8.08 K/UL — HIGH (ref 1.4–6.5)
NEUTROPHILS NFR BLD AUTO: 65.3 % — SIGNIFICANT CHANGE UP (ref 42.2–75.2)
NRBC # BLD: 0 /100 WBCS — SIGNIFICANT CHANGE UP (ref 0–0)
NRBC # BLD: 0 /100 WBCS — SIGNIFICANT CHANGE UP (ref 0–0)
PHOSPHATE SERPL-MCNC: 3.6 MG/DL — SIGNIFICANT CHANGE UP (ref 2.1–4.9)
PLATELET # BLD AUTO: 238 K/UL — SIGNIFICANT CHANGE UP (ref 130–400)
PLATELET # BLD AUTO: 271 K/UL — SIGNIFICANT CHANGE UP (ref 130–400)
POTASSIUM SERPL-MCNC: 3.9 MMOL/L — SIGNIFICANT CHANGE UP (ref 3.5–5)
POTASSIUM SERPL-SCNC: 3.9 MMOL/L — SIGNIFICANT CHANGE UP (ref 3.5–5)
RBC # BLD: 4.17 M/UL — LOW (ref 4.2–5.4)
RBC # BLD: 4.28 M/UL — SIGNIFICANT CHANGE UP (ref 4.2–5.4)
RBC # FLD: 14.4 % — SIGNIFICANT CHANGE UP (ref 11.5–14.5)
RBC # FLD: 14.6 % — HIGH (ref 11.5–14.5)
SARS-COV-2 IGG+IGM SERPL QL IA: >250 U/ML — HIGH
SARS-COV-2 IGG+IGM SERPL QL IA: POSITIVE
SODIUM SERPL-SCNC: 137 MMOL/L — SIGNIFICANT CHANGE UP (ref 135–146)
SURGICAL PATHOLOGY STUDY: SIGNIFICANT CHANGE UP
TROPONIN T SERPL-MCNC: <0.01 NG/ML — SIGNIFICANT CHANGE UP
WBC # BLD: 12.39 K/UL — HIGH (ref 4.8–10.8)
WBC # BLD: 17.7 K/UL — HIGH (ref 4.8–10.8)
WBC # FLD AUTO: 12.39 K/UL — HIGH (ref 4.8–10.8)
WBC # FLD AUTO: 17.7 K/UL — HIGH (ref 4.8–10.8)

## 2021-11-16 PROCEDURE — 93010 ELECTROCARDIOGRAM REPORT: CPT

## 2021-11-16 PROCEDURE — 99291 CRITICAL CARE FIRST HOUR: CPT | Mod: 24

## 2021-11-16 PROCEDURE — 71045 X-RAY EXAM CHEST 1 VIEW: CPT | Mod: 26

## 2021-11-16 RX ORDER — SODIUM CHLORIDE 9 MG/ML
1000 INJECTION, SOLUTION INTRAVENOUS ONCE
Refills: 0 | Status: COMPLETED | OUTPATIENT
Start: 2021-11-16 | End: 2021-11-16

## 2021-11-16 RX ORDER — MAGNESIUM SULFATE 500 MG/ML
2 VIAL (ML) INJECTION ONCE
Refills: 0 | Status: COMPLETED | OUTPATIENT
Start: 2021-11-16 | End: 2021-11-16

## 2021-11-16 RX ORDER — GABAPENTIN 400 MG/1
2.5 CAPSULE ORAL
Qty: 30 | Refills: 0
Start: 2021-11-16

## 2021-11-16 RX ORDER — POTASSIUM CHLORIDE 20 MEQ
20 PACKET (EA) ORAL
Refills: 0 | Status: COMPLETED | OUTPATIENT
Start: 2021-11-16 | End: 2021-11-16

## 2021-11-16 RX ADMIN — PANTOPRAZOLE SODIUM 40 MILLIGRAM(S): 20 TABLET, DELAYED RELEASE ORAL at 12:02

## 2021-11-16 RX ADMIN — HYDROMORPHONE HYDROCHLORIDE 0.5 MILLIGRAM(S): 2 INJECTION INTRAMUSCULAR; INTRAVENOUS; SUBCUTANEOUS at 13:01

## 2021-11-16 RX ADMIN — Medication 15 MILLIGRAM(S): at 01:51

## 2021-11-16 RX ADMIN — Medication 15 MILLIGRAM(S): at 10:16

## 2021-11-16 RX ADMIN — Medication 50 MILLIEQUIVALENT(S): at 03:40

## 2021-11-16 RX ADMIN — ENOXAPARIN SODIUM 40 MILLIGRAM(S): 100 INJECTION SUBCUTANEOUS at 12:02

## 2021-11-16 RX ADMIN — Medication 15 MILLIGRAM(S): at 11:16

## 2021-11-16 RX ADMIN — HYDROMORPHONE HYDROCHLORIDE 0.5 MILLIGRAM(S): 2 INJECTION INTRAMUSCULAR; INTRAVENOUS; SUBCUTANEOUS at 12:42

## 2021-11-16 RX ADMIN — Medication 50 GRAM(S): at 12:42

## 2021-11-16 RX ADMIN — SODIUM CHLORIDE 1000 MILLILITER(S): 9 INJECTION, SOLUTION INTRAVENOUS at 01:21

## 2021-11-16 RX ADMIN — MONTELUKAST 10 MILLIGRAM(S): 4 TABLET, CHEWABLE ORAL at 12:02

## 2021-11-16 RX ADMIN — ONDANSETRON 4 MILLIGRAM(S): 8 TABLET, FILM COATED ORAL at 07:36

## 2021-11-16 RX ADMIN — HYDROMORPHONE HYDROCHLORIDE 0.5 MILLIGRAM(S): 2 INJECTION INTRAMUSCULAR; INTRAVENOUS; SUBCUTANEOUS at 06:45

## 2021-11-16 NOTE — PROGRESS NOTE ADULT - SUBJECTIVE AND OBJECTIVE BOX
FLORENTIN PENA  [id number]  36y Female    Indication for ICU admission:  s/p lap sleeve gastrectomy, here for hemodynamic and respiratory monitoring     Admit Date: 11-15-21  ICU Date: 11-15-21  OR Date: 11-15-21    No Known Allergies    PAST MEDICAL & SURGICAL HISTORY:  Asthma    Carpal tunnel syndrome during pregnancy    Seasonal allergies    S/P tubal ligation    Home Medications:  Albuterol (Eqv-Proventil HFA) 90 mcg/inh inhalation aerosol: 2 puff(s) inhaled every 6 hours, As Needed (01 Nov 2021 07:02)  Singulair 10 mg oral tablet: 1 tab(s) orally once a day (01 Nov 2021 07:02)    24HRS EVENT:  Night  -cardiac enzymes negative x3  -tolerating bariatric clear diet, no nausea/vomiting  -ambulating, voiding  -tachycardic 105-110 --> resolved with 1L IVF    Day  Patient complaining of chest pressure upon awaking. EKG initally demonstrated TWI leads I & II, cardiac enyzmes negative. Repeat EKG shows resolution of TWI in I and II; new TWI in lead III, no reciprocal changes/CHEMO.    DVT PTX: Lovenox 40 daily, SCDs    GI PTX: PPI    ***Tubes/Lines/Drains  ***  Peripheral IV    REVIEW OF SYSTEMS    [X] A ten-point review of systems was otherwise negative except as noted.  [ ] Due to altered mental status/intubation, subjective information were not able to be obtained from the patient. History was obtained, to the extent possible, from review of the chart and collateral sources of information.       FLORENTIN PENA  [id number]  36y Female    Indication for ICU admission:  s/p lap sleeve gastrectomy, here for hemodynamic and respiratory monitoring     Admit Date: 11-15-21  ICU Date: 11-15-21  OR Date: 11-15-21    No Known Allergies    PAST MEDICAL & SURGICAL HISTORY:  Asthma    Carpal tunnel syndrome during pregnancy    Seasonal allergies    S/P tubal ligation    Home Medications:  Albuterol (Eqv-Proventil HFA) 90 mcg/inh inhalation aerosol: 2 puff(s) inhaled every 6 hours, As Needed (2021 07:02)  Singulair 10 mg oral tablet: 1 tab(s) orally once a day (2021 07:02)    24HRS EVENT:  Night  -cardiac enzymes negative x3  -tolerating bariatric clear diet, no nausea/vomiting  -ambulating, voiding  -tachycardic 105-110 --> resolved with 1L IVF    Day  Patient complaining of chest pressure upon awaking. EKG initally demonstrated TWI leads I & II, cardiac enyzmes negative. Repeat EKG shows resolution of TWI in I and II; new TWI in lead III, no reciprocal changes/CHEMO.    DVT PTX: Lovenox 40 daily, SCDs    GI PTX: PPI    ***Tubes/Lines/Drains  ***  Peripheral IV    REVIEW OF SYSTEMS    [X] A ten-point review of systems was otherwise negative except as noted.  [ ] Due to altered mental status/intubation, subjective information were not able to be obtained from the patient. History was obtained, to the extent possible, from review of the chart and collateral sources of information.      Daily     Daily Weight in k.7 (2021 06:00)    Diet, Clear Liquid:   Bariatric Clear Liquid (BARICLLIQ) (11-15-21 @ 09:48)      CURRENT MEDS:  Neurologic Medications  acetaminophen   IVPB .. 1000 milliGRAM(s) IV Intermittent once PRN Moderate Pain (4 - 6)  gabapentin Solution 100 milliGRAM(s) Oral three times a day PRN pain  HYDROmorphone  Injectable 0.5 milliGRAM(s) IV Push every 6 hours PRN Severe Pain (7 - 10)  ketorolac   Injectable 15 milliGRAM(s) IV Push every 8 hours PRN Moderate Pain (4 - 6)  ondansetron Injectable 4 milliGRAM(s) IV Push every 6 hours PRN Nausea    Respiratory Medications  ALBUTerol    0.083%. 2.5 milliGRAM(s) Nebulizer once PRN Shortness of Breath and/or Wheezing  ALBUTerol    90 MICROgram(s) HFA Inhaler 2 Puff(s) Inhalation every 6 hours PRN Shortness of Breath and/or Wheezing  montelukast 10 milliGRAM(s) Oral daily    Cardiovascular Medications    Gastrointestinal Medications  lactated ringers. 1000 milliLiter(s) IV Continuous <Continuous>  pantoprazole  Injectable 40 milliGRAM(s) IV Push daily    Genitourinary Medications    Hematologic/Oncologic Medications  enoxaparin Injectable 40 milliGRAM(s) SubCutaneous daily  influenza   Vaccine 0.5 milliLiter(s) IntraMuscular once    Antimicrobial/Immunologic Medications    Endocrine/Metabolic Medications    Topical/Other Medications      ICU Vital Signs Last 24 Hrs  T(C): 36.7 (2021 07:32), Max: 36.7 (2021 05:00)  T(F): 98.1 (2021 07:32), Max: 98.1 (2021 07:32)  HR: 102 (2021 13:00) (95 - 109)  BP: 107/70 (2021 13:00) (107/70 - 154/95)  BP(mean): 83 (2021 13:00) (83 - 118)  ABP: --  ABP(mean): --  RR: 18 (2021 13:00) (16 - 20)  SpO2: 94% (2021 13:00) (93% - 98%)      Adult Advanced Hemodynamics Last 24 Hrs  CVP(mm Hg): --  CVP(cm H2O): --  CO: --  CI: --  PA: --  PA(mean): --  PCWP: --  SVR: --  SVRI: --  PVR: --  PVRI: --          I&O's Summary    15 Nov 2021 07:01  -  2021 07:00  --------------------------------------------------------  IN: 3570 mL / OUT: 0 mL / NET: 3570 mL    2021 07:01  -  2021 13:51  --------------------------------------------------------  IN: 745 mL / OUT: 0 mL / NET: 745 mL      I&O's Detail    15 Nov 2021 07:01  -  2021 07:00  --------------------------------------------------------  IN:    IV PiggyBack: 250 mL    Lactated Ringers: 2000 mL    Lactated Ringers Bolus: 1000 mL    Oral Fluid: 320 mL  Total IN: 3570 mL    OUT:  Total OUT: 0 mL    Total NET: 3570 mL      2021 07:01  -  2021 13:51  --------------------------------------------------------  IN:    Lactated Ringers: 625 mL    Oral Fluid: 120 mL  Total IN: 745 mL    OUT:  Total OUT: 0 mL    Total NET: 745 mL          PHYSICAL EXAM:    General/Neuro  RASS: GCS:    15  Deficits:  alert & oriented x 3, no focal deficits    Lungs:      clear to auscultation, Normal expansion/effort.     Cardiovascular : S1, S2.  Regular rate and rhythm.     GI: Abdomen soft, Non-tender, Non-distended.    Wound: laparoscopic incisions well healing, no erythema, edema, or discharge    Extremities: Extremities warm, pink, well-perfused.    Derm: Good skin turgor, no skin breakdown.      :       Betancur catheter in place.      CXR: low lung volumes, unchanged    LABS:  CAPILLARY BLOOD GLUCOSE                              11.1   12.39 )-----------( 238      ( 2021 11:10 )             34.5       11-    137  |  103  |  9<L>  ----------------------------<  88  3.9   |  17  |  0.5<L>    Ca    8.5      2021 00:31  Phos  3.6     11-16  Mg     2.1     11-16          CARDIAC MARKERS ( 2021 00:31 )  x     / <0.01 ng/mL / x     / x     / 1.6 ng/mL  CARDIAC MARKERS ( 15 Nov 2021 16:00 )  x     / <0.01 ng/mL / 82 U/L / x     / 1.7 ng/mL  CARDIAC MARKERS ( 15 Nov 2021 11:46 )  x     / <0.01 ng/mL / 61 U/L / x     / 1.3 ng/mL

## 2021-11-16 NOTE — DISCHARGE NOTE PROVIDER - NSDCFUSCHEDAPPT_GEN_ALL_CORE_FT
FLORENTIN PENA ; 11/19/2021 ; [facility] [address] ; 11/24/2021 ; N[facility] [place] [address]

## 2021-11-16 NOTE — PROGRESS NOTE ADULT - SUBJECTIVE AND OBJECTIVE BOX
BARIATRIC SURGERY PROGRESS NOTE    Events of past 24 hours:   NAEON.  Patient noted to be tachy to 114s, upon evaluation asymptomatic, denied chest pain, SOB, palpitations, or any other symptoms.  Given bolus IVF and will reassess.  Reports pain well controlle don PRN medications.  Denies nausea.  Has completed all rows of cups on bariatric protocol.  +OOB and ambulating.  +Voiding, -BM, -Flatus.      Vitals:   T(F): 97.1 (11-15-21 @ 23:37), Max: 97.7 (11-15-21 @ 09:35)  HR: 105 (11-16-21 @ 00:00) (85 - 109)  BP: 116/66 (11-16-21 @ 00:00) (116/66 - 154/95)  RR: 16 (11-16-21 @ 00:00)  SpO2: 96% (11-16-21 @ 00:00) (93% - 100%)      Diet, Clear Liquid:   Bariatric Clear Liquid (BARICLLIQ)      Fluids: lactated ringers Bolus:   1000 milliLiter(s), IV Bolus, once, infuse over 60 Minute(s), Stop After 1 Doses  Provider's Contact #: [phone]  lactated ringers.: Solution, 1000 milliLiter(s) infuse at 125 mL/Hr      I & O's:          PHYSICAL EXAM:  General Appearance: NAD  Heart: RR  Lungs: Unlabored breathing at rest  Abdomen:  Soft, appropriately , nondistended. No guarding or rebound.  Laparoscopic incision sites appear  well healing.  MSK/Extremities: Warm & well-perfused.   Skin: Warm, dry. No jaundice.       MEDICATIONS  (STANDING):  enoxaparin Injectable 40 milliGRAM(s) SubCutaneous daily  influenza   Vaccine 0.5 milliLiter(s) IntraMuscular once  lactated ringers Bolus 1000 milliLiter(s) IV Bolus once  lactated ringers. 1000 milliLiter(s) (125 mL/Hr) IV Continuous <Continuous>  montelukast 10 milliGRAM(s) Oral daily  pantoprazole  Injectable 40 milliGRAM(s) IV Push daily    MEDICATIONS  (PRN):  acetaminophen   IVPB .. 1000 milliGRAM(s) IV Intermittent once PRN Moderate Pain (4 - 6)  ALBUTerol    0.083%. 2.5 milliGRAM(s) Nebulizer once PRN Shortness of Breath and/or Wheezing  ALBUTerol    90 MICROgram(s) HFA Inhaler 2 Puff(s) Inhalation every 6 hours PRN Shortness of Breath and/or Wheezing  gabapentin Solution 100 milliGRAM(s) Oral three times a day PRN pain  HYDROmorphone  Injectable 0.5 milliGRAM(s) IV Push every 6 hours PRN Severe Pain (7 - 10)  ketorolac   Injectable 15 milliGRAM(s) IV Push every 8 hours PRN Moderate Pain (4 - 6)  ondansetron Injectable 4 milliGRAM(s) IV Push every 6 hours PRN Nausea        LAB/STUDIES:                          12.9   9.09  )-----------( 242      ( 15 Nov 2021 10:30 )             40.6       11-15    133<L>  |  100  |  11  ----------------------------<  121<H>  4.0   |  18  |  0.5<L>    Ca    8.6      15 Nov 2021 16:00  Phos  3.4     11-15  Mg     2.4     11-15                        Lactate Trend      CARDIAC MARKERS ( 15 Nov 2021 16:00 )  x     / <0.01 ng/mL / 82 U/L / x     / 1.7 ng/mL  CARDIAC MARKERS ( 15 Nov 2021 11:46 )  x     / <0.01 ng/mL / 61 U/L / x     / 1.3 ng/mL        CAPILLARY BLOOD GLUCOSE

## 2021-11-16 NOTE — PROGRESS NOTE ADULT - ASSESSMENT
ASSESSMENT:  36F POD 1 s/p laparoscopic sleeve gastrectomy      PLAN:  - Monitor vitals, HR, FU IVF bolus  - Monitor labs and replete as necessary  - Monitor for bowel function  - Continue PRN pain medications and anti-emetics  - Diet as per bariatric protocol  - Encourage ambulation as tolerated  - Monitor urine output  - DVT and GI Prophylaxis  - Care per ICU team    Kenyon Gomez MD  General Surgery PGY-1  [facility] TE[facility] [id number]     ASSESSMENT:  36F POD 1 s/p laparoscopic sleeve gastrectomy      PLAN:  - Monitor vitals, HR, FU IVF bolus  - Monitor labs and replete as necessary  - Monitor for bowel function  - Continue PRN pain medications and anti-emetics  - Diet as per bariatric protocol  - Encourage ambulation as tolerated  - Monitor urine output  - DVT and GI Prophylaxis  - Care per ICU team    Kenyon Gomez MD  General Surgery PGY-1  [facility] TE[facility] S[facility] [id number]    Fellow Note:   Seen and examined with Dr Lezama this AM. Raissa 4oz/hr clover clears. No nausea. C/o chest and epigastric pain that is improved from yesterday. Labs overnight with Hct 35 from 40. WBC post op up to 17.   HR in 110s, pt c/o pain, improved with fluid. 105 this AM when rounded on pt.   Abd soft, min approp ttp, all incisions c/d/i.     A/P POD1 lap sleeve gastrectomy  Continue to monitor tachycardia  PRN pain meds, nausea meds  Recheck CBC this AM  Continue clover clears  Will re-assess this afternoon labs/vitals    Tiffany Diez MD MIS Fellow

## 2021-11-16 NOTE — DISCHARGE NOTE PROVIDER - NSDCMRMEDTOKEN_GEN_ALL_CORE_FT
Albuterol (Eqv-Proventil HFA) 90 mcg/inh inhalation aerosol: 2 puff(s) inhaled every 6 hours, As Needed  gabapentin 250 mg/5 mL oral solution: 2.5 milliliter(s) orally every 8 hours, As Needed -pain   Singulair 10 mg oral tablet: 1 tab(s) orally once a day

## 2021-11-16 NOTE — CHART NOTE - NSCHARTNOTEFT_GEN_A_CORE
SICU team evaluated patient on evening rounds, patient noted to be tachycardic with a -110. At this time patient was ambulating and complaining of pain. Once patient situated in bed, pain medication was administered. Patient's heart rate continued to be elevated despite patient being at rest or asleep and given pain medication. SICU and MIS fellow contacted. Per MIS fellow, treatment plan 1L IVF and lab follow up. Abdominal exam unchanged. Patient tolerating bariatric clear diet without difficulty, denies nausea/vomiting.     S/p 1 liter IVF patient's heart rate decreased, now in the 90s for ~2 hours. Patient resting comfortably.

## 2021-11-16 NOTE — PROGRESS NOTE ADULT - ATTENDING COMMENTS
Pt. seen and examined earlier this morning.  She was tolerating bariatric clear liquids, no nausea or vomiting.  She was c/o epigastric pain.  She had ambulated with assistance.  Heart rate in low 100's.    A&OX3, NAD  Abd soft, NT, ND, incisions C/D/I    Labs reviewed and repeat WBC is 12, H/H stable    D/C home today on Bariatric Stage 1 diet  F/u in my office in 1 week, she already has a postoperative appointment made

## 2021-11-16 NOTE — DISCHARGE NOTE NURSING/CASE MANAGEMENT/SOCIAL WORK - NSTRANSFERBELONGINGSDISPO_GEN_A_NUR
Patient requested and was given 1 mg melatonin for sleep. Sleep interrupted when patient developed leg cramps, which he reports happens often. Heat packs given. Pt uses CPAP at home but did not bring it to the hospital. No hospital CPAP machine available so patient is on 2L O2 for the night. Temp 99.8. Patient declined any interventions. Independent in room. VSS.    not applicable

## 2021-11-16 NOTE — DISCHARGE NOTE PROVIDER - HOSPITAL COURSE
This is a 36 year old female with a PMH/PSH of asthma and obesity who presented on 11/15 electively and is now s/p laparoscopic sleeve gastrectomy. Patient tolerated the procedure well and was extubated post-operatively. Patient became tachycardic to the 120s overnight and was given 1L IVFs with improvement. She was advanced to bariatric clear liquid diet which she has been tolerating without nausea or vomiting. She is ambulating independently and voiding without issue. Pain is appropriate for post-op period. Patient continues to be mildly tachycardic but appears to be pain related. Repeat labs stable. Patient is stable for discharge home.

## 2021-11-16 NOTE — PROGRESS NOTE ADULT - ASSESSMENT
Assessment & Plan  36y Female  s/p lap sleeve gastrectomy    NEURO:  Acute pain-controlled with:  -Tylenol  -Toradol  -Gabapentin sln    RESP:   Oxygenation- extubated post op, satting well on RA  Encourage incentive spirometry  Daily AM CXR  History of asthma and LEXIE:   -continue home singulair and albuterol    CARDS:   Transient tachycardia --> resolved with 1L IVF  Repeat EKG:  QTc: 484  Cardiac enzymes negative x3    GI/NUTR:   Diet: Bariatric clear diet -- tolerating  GI Prophylaxis- PPI  Bowel regimen- holding  Nausea/vomiting: zofran prn    /RENAL:   Monitor UO  Passed TOV  LR @ 125 mL/hr  -1L of LR overnight    Labs:          BUN/Cr- 9/0.5          Electrolytes-Na 137 // K 3.9 // Mg 2.1 //  Phos 3.6    HEME/ONC:     DVT prophylaxis- SCDs, lovenox 40 daily     Labs: Hb/Hct:  11.4/35.7              Plts:  271              PTT/INR:          ID:  WBC- 17  Temp trend- 24hrs T(F): 97.7 (11-15 @ 09:35), Max: 97.7 (11-15 @ 09:35)    ENDO:  Monitor glucose  HA1C - 5.7 on pre op testing  Fingersticks q6    LINES/DRAINS:  PIV    DISPO:    SICU discussed with attending Dr. Grier Assessment & Plan  36y Female  s/p lap sleeve gastrectomy    NEURO:  Acute pain-controlled with:  -Tylenol  -Toradol  -Gabapentin sln    RESP:   Oxygenation- extubated post op, satting well on RA  Encourage incentive spirometry  Daily AM CXR  History of asthma and LEXIE:   -continue home singulair and albuterol    CARDS:   Transient tachycardia --> resolved with 1L IVF  Repeat EKG:  QTc: 484  Cardiac enzymes negative x3    GI/NUTR:   Diet: Bariatric clear diet -- tolerating  GI Prophylaxis- PPI  Bowel regimen- holding  Nausea/vomiting: zofran prn    /RENAL:   Monitor UO  Passed TOV  LR @ 125 mL/hr  -1L of LR overnight    Labs:          BUN/Cr- 9/0.5          Electrolytes-Na 137 // K 3.9 // Mg 2.1 //  Phos 3.6    HEME/ONC:     DVT prophylaxis- SCDs, lovenox 40 daily     Labs: Hb/Hct:  11.4/35.7              Plts:  271              PTT/INR:       Hgb stable, will dc      ID:  WBC- 17  Temp trend- 24hrs T(F): 97.7 (11-15 @ 09:35), Max: 97.7 (11-15 @ 09:35)    ENDO:  Monitor glucose  HA1C - 5.7 on pre op testing  Fingersticks q6    LINES/DRAINS:  PIV    DISPO:    SICU discussed with attending Dr. Grier

## 2021-11-16 NOTE — DISCHARGE NOTE PROVIDER - NSDCCPCAREPLAN_GEN_ALL_CORE_FT
PRINCIPAL DISCHARGE DIAGNOSIS  Diagnosis: S/P laparoscopic sleeve gastrectomy  Assessment and Plan of Treatment: -Diet: Advance diet as instructed per bariatric protocol.  -Activity: Avoid havy lifting or straining (anything over 10-15 pounds) x 4-6 weeks. You may otherwise resume normal daily activity as tolerated.  -Incisions: You may shower and allow soap and water to rinse over incisions. Please avoid aggressive scrubbing of the areas and do not submerge in water for at least 2 weeks.  -Medications: Please take medications as previously prescribed and instructed during your pre-operative appointments. A prescription has been sent to [facility] (located within the hospital, first floor across from gift shop) for Gabapentin 2.5mL every 8 hours as needed for pain.  -Follow-up: Please call to schedule a follow-up appointment with Dr. Lezama within 1-2 weeks, or as previously scheduled.

## 2021-11-16 NOTE — DISCHARGE NOTE NURSING/CASE MANAGEMENT/SOCIAL WORK - NSDCVIVACCINE_GEN_ALL_CORE_FT
MMR; 09-May-2019 11:46; Yasmine Vinson); [employer] [id number] (Exp. Date: 01-Oct-2020); SubCutaneous; Arm Right; 0.5 milliLiter(s); VIS (VIS Published: 20-Apr-2012, VIS Presented: 09-May-2019);

## 2021-11-16 NOTE — PROGRESS NOTE ADULT - ATTENDING COMMENTS
doing well PO   mild tachycardia   ambulating   tolerating bariatric diet   repeat CBC   possible d/c home today   d/w primary team

## 2021-11-16 NOTE — DISCHARGE NOTE NURSING/CASE MANAGEMENT/SOCIAL WORK - PATIENT PORTAL LINK FT
You can access the FollowMyHealth Patient Portal offered by [facility] by registering at the following website: [email or web address]. By joining [employer]’s FollowMyHealth portal, you will also be able to view your health information using other applications (apps) compatible with our system.

## 2021-11-16 NOTE — DISCHARGE NOTE PROVIDER - CARE PROVIDER_API CALL
Lizbeth Lezama (MD)  Surgery  [address] [place] [address], [address] [place] [address]  Phone: [phone]  Fax: [phone]  Follow Up Time: 1 week

## 2021-11-17 LAB
COVID-19 NUCLEOCAPSID GAM AB INTERP: POSITIVE
COVID-19 NUCLEOCAPSID TOTAL GAM ANTIBODY RESULT: 2.92 INDEX — HIGH
SARS-COV-2 IGG+IGM SERPL QL IA: 2.92 INDEX — HIGH
SARS-COV-2 IGG+IGM SERPL QL IA: POSITIVE

## 2021-11-17 RX ORDER — GABAPENTIN 250 MG/5ML
250 SOLUTION ORAL EVERY 8 HOURS
Qty: 15 | Refills: 0 | Status: DISCONTINUED | COMMUNITY
Start: 2021-11-05 | End: 2021-11-17

## 2021-11-19 ENCOUNTER — APPOINTMENT (OUTPATIENT)
Dept: PULMONOLOGY | Facility: CLINIC | Age: 36
End: 2021-11-19

## 2021-11-19 DIAGNOSIS — E66.01 MORBID (SEVERE) OBESITY DUE TO EXCESS CALORIES: ICD-10-CM

## 2021-11-19 DIAGNOSIS — J45.909 UNSPECIFIED ASTHMA, UNCOMPLICATED: ICD-10-CM

## 2021-11-19 DIAGNOSIS — R00.0 TACHYCARDIA, UNSPECIFIED: ICD-10-CM

## 2021-11-19 DIAGNOSIS — G47.33 OBSTRUCTIVE SLEEP APNEA (ADULT) (PEDIATRIC): ICD-10-CM

## 2021-11-19 DIAGNOSIS — Z86.16 PERSONAL HISTORY OF COVID-19: ICD-10-CM

## 2021-11-19 DIAGNOSIS — E86.0 DEHYDRATION: ICD-10-CM

## 2021-11-24 ENCOUNTER — APPOINTMENT (OUTPATIENT)
Dept: SURGERY | Facility: CLINIC | Age: 36
End: 2021-11-24
Payer: COMMERCIAL

## 2021-11-24 VITALS
HEART RATE: 88 BPM | SYSTOLIC BLOOD PRESSURE: 122 MMHG | HEIGHT: 61 IN | BODY MASS INDEX: 36.44 KG/M2 | WEIGHT: 193 LBS | TEMPERATURE: 98 F | OXYGEN SATURATION: 97 % | DIASTOLIC BLOOD PRESSURE: 68 MMHG

## 2021-11-24 DIAGNOSIS — J45.909 UNSPECIFIED ASTHMA, UNCOMPLICATED: ICD-10-CM

## 2021-11-24 DIAGNOSIS — K21.9 GASTRO-ESOPHAGEAL REFLUX DISEASE W/OUT ESOPHAGITIS: ICD-10-CM

## 2021-11-24 PROCEDURE — 99024 POSTOP FOLLOW-UP VISIT: CPT

## 2021-11-24 RX ORDER — CALCIUM CARB/VITAMIN D3/VIT K1 650MG-12.5
TABLET,CHEWABLE ORAL DAILY
Refills: 0 | Status: ACTIVE | COMMUNITY

## 2021-11-24 RX ORDER — PEDI MULTIVIT NO.25/FOLIC ACID 300 MCG
TABLET,CHEWABLE ORAL DAILY
Refills: 0 | Status: ACTIVE | COMMUNITY

## 2021-11-24 NOTE — ADDENDUM
[FreeTextEntry1] : Pt. seen.  Questions answered.  Reassurance and support provided. Will have her increase her Omeprazole to BID, since her Insurance did not cover the 40mg dose.\par F/u in 3 weeks. \par Call sooner with questions/concerns.

## 2021-11-24 NOTE — PHYSICAL EXAM
[de-identified] : Soft, nondistended [de-identified] : Incisions dry and clean with no evidence of erythema

## 2021-11-24 NOTE — ASSESSMENT
[___ Days Post Op] : [unfilled] days [Previous Visit Weight: ___] : Previous visit weight: [unfilled] lbs [Today's Weight: ___] : Today's weight:[unfilled] lbs [de-identified] : FLORENTIN PENA is a 36 year female seen today for postoperative bariatric follow up visit. \par She had Sugar Free pudding and lentil soup since surgery and is getting. She is drinking 2 protein shakes daily.\par Fluid intake is adequate with mostly water, Crystal Light and Sugar Free Jello.\par She is walking for 10 -15 minutes nightly. Reinforced no heavy lifting, bending and pulling for 2 months.\par \par Plan: Continue to advance diet as tolerated. Will have RD review diet advancement with patient. RTO in 3 weeks. Call with concerns.

## 2021-12-13 RX ORDER — URSODIOL 300 MG/1
300 CAPSULE ORAL
Qty: 60 | Refills: 5 | Status: COMPLETED | COMMUNITY
Start: 2021-12-13 | End: 2022-06-15

## 2021-12-13 RX ORDER — OMEPRAZOLE 20 MG/1
20 CAPSULE, DELAYED RELEASE ORAL
Qty: 60 | Refills: 2 | Status: DISCONTINUED | COMMUNITY
Start: 2021-11-24 | End: 2021-12-13

## 2021-12-15 ENCOUNTER — APPOINTMENT (OUTPATIENT)
Dept: SURGERY | Facility: CLINIC | Age: 36
End: 2021-12-15

## 2022-02-23 ENCOUNTER — APPOINTMENT (OUTPATIENT)
Dept: SURGERY | Facility: CLINIC | Age: 37
End: 2022-02-23

## 2022-03-07 NOTE — ASU PATIENT PROFILE, ADULT - AS SC BRADEN SENSORY
Post-Op Assessment Note    CV Status:  Stable  Pain Score: 0    Pain management: adequate     Mental Status:  Alert and awake   Hydration Status:  Euvolemic   PONV Controlled:  Controlled   Airway Patency:  Patent      Post Op Vitals Reviewed: Yes      Staff: CRNA         No complications documented      /57 (03/07/22 1244)    Temp (!) 97 °F (36 1 °C) (03/07/22 1244)    Pulse 58 (03/07/22 1244)   Resp 16 (03/07/22 1244)    SpO2 100 % (03/07/22 1244)
(4) no impairment

## 2022-05-18 NOTE — ASU DISCHARGE PLAN (ADULT/PEDIATRIC) - DISCHARGE PLAN IS COMPLETE AND GIVEN TO PATIENT
attending notified of patient's BP, as per attending Dr. Andrew pizano to start blood transfusion : Yes

## 2022-08-16 NOTE — H&P PST ADULT - MAMMOGRAM, RESULTS OF LAST, PROFILE
Duplicates refused.
[Dear  ___] : Dear  [unfilled],
[Consult Letter:] : I had the pleasure of evaluating your patient, [unfilled].
[Please see my note below.] : Please see my note below.
[Consult Closing:] : Thank you very much for allowing me to participate in the care of this patient.  If you have any questions, please do not hesitate to contact me.
[Sincerely,] : Sincerely,
[FreeTextEntry3] : Rose Yin MD\par Medical Director, Pediatric Concussion Program \par , [employer] [facility]\par Department of Pediatric Neurology\par  for Specialty Care \par [facility]\par [address]\par [facility] [address]\par Tel: [phone]\par Fax: [phone]\par \par \par 
wnl

## 2022-08-31 ENCOUNTER — APPOINTMENT (OUTPATIENT)
Dept: SURGERY | Facility: CLINIC | Age: 37
End: 2022-08-31

## 2022-12-15 NOTE — OB RN PATIENT PROFILE - BREAST MILK SUPPORTS STABLE NEWBORN BLOOD SUGAR
Interval History: Hgb: 7.0, give 1U PRBC today, GYN initiated megace. Repeat H/H following transfusion today and repeat labs in AM.     Review of Systems   Constitutional:  Positive for activity change and fatigue. Negative for appetite change, chills, diaphoresis, fever and unexpected weight change.   HENT:  Negative for congestion, hearing loss, mouth sores, postnasal drip, rhinorrhea, sore throat and trouble swallowing.    Eyes:  Negative for discharge and visual disturbance.   Respiratory:  Positive for shortness of breath (with exertion). Negative for cough and chest tightness.    Cardiovascular:  Negative for chest pain, palpitations and leg swelling.   Gastrointestinal:  Negative for blood in stool, constipation, diarrhea, nausea and vomiting.   Endocrine: Negative for cold intolerance and heat intolerance.   Genitourinary:  Positive for vaginal bleeding. Negative for difficulty urinating, dyspareunia, flank pain and hematuria.   Musculoskeletal:  Positive for arthralgias. Negative for back pain and myalgias.   Skin: Negative.    Neurological:  Positive for weakness. Negative for dizziness, light-headedness and headaches.   Hematological:  Negative for adenopathy. Does not bruise/bleed easily.   Psychiatric/Behavioral:  Negative for agitation, behavioral problems and confusion. The patient is nervous/anxious.    Objective:     Vital Signs (Most Recent):  Temp: 98 °F (36.7 °C) (12/15/22 1357)  Pulse: 80 (12/15/22 1357)  Resp: 18 (12/15/22 1357)  BP: (!) 118/59 (12/15/22 1357)  SpO2: 98 % (12/15/22 1357)   Vital Signs (24h Range):  Temp:  [97 °F (36.1 °C)-98.9 °F (37.2 °C)] 98 °F (36.7 °C)  Pulse:  [77-99] 80  Resp:  [17-20] 18  SpO2:  [96 %-100 %] 98 %  BP: (118-178)/(59-84) 118/59     Weight: 81.3 kg (179 lb 2 oz)  Body mass index is 32.76 kg/m².    Intake/Output Summary (Last 24 hours) at 12/15/2022 1628  Last data filed at 12/15/2022 0800  Gross per 24 hour   Intake 300.42 ml   Output --   Net 300.42 ml       Physical Exam  Vitals and nursing note reviewed.   Constitutional:       General: She is not in acute distress.     Appearance: She is well-developed. She is ill-appearing.   HENT:      Head: Normocephalic and atraumatic.      Right Ear: Hearing and external ear normal.      Left Ear: Hearing and external ear normal.      Nose: No rhinorrhea.      Right Sinus: No maxillary sinus tenderness or frontal sinus tenderness.      Left Sinus: No maxillary sinus tenderness or frontal sinus tenderness.      Mouth/Throat:      Mouth: No oral lesions.      Pharynx: Uvula midline.   Eyes:      General:         Right eye: No discharge.         Left eye: No discharge.      Conjunctiva/sclera: Conjunctivae normal.      Pupils: Pupils are equal, round, and reactive to light.   Neck:      Thyroid: No thyromegaly.      Vascular: No carotid bruit.      Trachea: No tracheal deviation.   Cardiovascular:      Rate and Rhythm: Normal rate and regular rhythm.      Pulses:           Dorsalis pedis pulses are 2+ on the right side and 2+ on the left side.      Heart sounds: Normal heart sounds, S1 normal and S2 normal. No murmur heard.  Pulmonary:      Effort: Pulmonary effort is normal. No respiratory distress.      Breath sounds: Normal breath sounds.   Abdominal:      General: Bowel sounds are normal. There is no distension.      Palpations: Abdomen is soft. There is no mass.      Tenderness: There is no abdominal tenderness.   Musculoskeletal:         General: Normal range of motion.      Cervical back: Normal range of motion.   Lymphadenopathy:      Cervical: No cervical adenopathy.      Upper Body:      Right upper body: No supraclavicular adenopathy.      Left upper body: No supraclavicular adenopathy.   Skin:     General: Skin is warm and dry.      Capillary Refill: Capillary refill takes less than 2 seconds.      Coloration: Skin is pale.      Findings: No rash.   Neurological:      Mental Status: She is alert and oriented to  person, place, and time.      Sensory: No sensory deficit.      Coordination: Coordination normal.      Gait: Gait normal.   Psychiatric:         Mood and Affect: Mood is not anxious or depressed.         Speech: Speech normal.         Behavior: Behavior normal.         Thought Content: Thought content normal.         Judgment: Judgment normal.       Significant Labs: All pertinent labs within the past 24 hours have been reviewed.  CBC:   Recent Labs   Lab 12/14/22  1144 12/14/22  1830 12/15/22  0606   WBC 4.54 4.19 4.38   HGB 4.9* 7.0* 7.0*   HCT 16.5* 23.1* 22.6*    364 371     CMP:   Recent Labs   Lab 12/14/22  1144 12/15/22  0605    140   K 3.6 3.8   * 111*   CO2 20* 20*   * 98   BUN 12 12   CREATININE 0.8 0.7   CALCIUM 8.4* 8.3*   PROT 6.1 5.8*   ALBUMIN 3.5 3.4*   BILITOT 0.4 1.0   ALKPHOS 50* 50*   AST 22 22   ALT 21 20   ANIONGAP 8 9       Significant Imaging: I have reviewed all pertinent imaging results/findings within the past 24 hours.   Statement Selected

## 2022-12-21 ENCOUNTER — APPOINTMENT (OUTPATIENT)
Dept: NEUROLOGY | Facility: CLINIC | Age: 37
End: 2022-12-21

## 2022-12-21 VITALS
SYSTOLIC BLOOD PRESSURE: 124 MMHG | HEART RATE: 91 BPM | HEIGHT: 61 IN | WEIGHT: 140 LBS | BODY MASS INDEX: 26.43 KG/M2 | DIASTOLIC BLOOD PRESSURE: 89 MMHG

## 2022-12-21 DIAGNOSIS — G44.40 DRUG-INDUCED HEADACHE, NOT ELSEWHERE CLASSIFIED, NOT INTRACTABLE: ICD-10-CM

## 2022-12-21 DIAGNOSIS — G47.61 PERIODIC LIMB MOVEMENT DISORDER: ICD-10-CM

## 2022-12-21 DIAGNOSIS — G43.709 CHRONIC MIGRAINE W/OUT AURA, NOT INTRACTABLE, W/OUT STATUS MIGRAINOSUS: ICD-10-CM

## 2022-12-21 DIAGNOSIS — T39.95XA DRUG-INDUCED HEADACHE, NOT ELSEWHERE CLASSIFIED, NOT INTRACTABLE: ICD-10-CM

## 2022-12-21 PROCEDURE — 99204 OFFICE O/P NEW MOD 45 MIN: CPT

## 2022-12-21 RX ORDER — MAGNESIUM OXIDE 420 MG
420 (252 MG) TABLET ORAL
Qty: 30 | Refills: 5 | Status: ACTIVE | COMMUNITY
Start: 2022-12-21 | End: 1900-01-01

## 2022-12-21 RX ORDER — SUMATRIPTAN 100 MG/1
100 TABLET, FILM COATED ORAL
Qty: 9 | Refills: 2 | Status: ACTIVE | COMMUNITY
Start: 2022-12-21 | End: 1900-01-01

## 2022-12-21 NOTE — ASSESSMENT
[FreeTextEntry1] :  chronic persistent daily headache- probably attributable to the rebound phenomenon from  anti-inflammatories.  I recommend  cutting down at least 2 tablets of the anti-inflammatory per week as well as a trial of magnesium for prophylaxis as well as sumatriptan for abortive therapy.\par  \par \par   periodic limb movements of sleep- I would like to send her for a repeat sleep study

## 2022-12-21 NOTE — HISTORY OF PRESENT ILLNESS
[FreeTextEntry1] : It's a pleasure to see Ms. FLORENTIN PENA In the office today. She is a 37 year -  old woman  who presents to the office today for the evaluation of Worsening chronic daily headaches for many years especially in the last year.  She reports that she has pounding headache over her head and it often happens upon awakening.  She has gone for MRI of the brain which did not show any significant intracranial pathology.  She had a sleep study prior to her bariatric surgery and it only showed mild sleep apnea with AHI of only 6.4.  However she continues to complain about frequent awakening at night, at least 4 times at night and she has noticed leg jerking during sleep frequently but she is not sure whether that is the reason for her waking up.  Over the last year she has been taking Tylenol 4-6 tablets a day every day and  it is hurting her stomach.\par

## 2022-12-22 ENCOUNTER — APPOINTMENT (OUTPATIENT)
Dept: NEUROLOGY | Facility: CLINIC | Age: 37
End: 2022-12-22

## 2023-02-27 ENCOUNTER — NON-APPOINTMENT (OUTPATIENT)
Age: 38
End: 2023-02-27

## 2023-03-09 ENCOUNTER — APPOINTMENT (OUTPATIENT)
Dept: NEUROLOGY | Facility: CLINIC | Age: 38
End: 2023-03-09

## 2023-05-05 NOTE — OB PROVIDER H&P - PRO RUBELLA INFANT
CHIEF COMPLAINT:  Trisha Amaya is here today for First Annual Medicare Wellness Visit.      Medication verified and med list updated    Refills needed today?  No          Patient would like communication of their results via:   Home Phone: [phone] (home)  Okay to leave a message containing results? Yes     CHOLESTEROL (mg/dL)   Date Value   12/15/2017 219 (H)     HDL (mg/dL)   Date Value   12/15/2017 75     No components found for: CHOLHDLRATIO  TRIGLYCERIDE (mg/dL)   Date Value   12/15/2017 127     CALCULATED LDL (mg/dL)   Date Value   12/15/2017 119      Glucose (mg/dL)   Date Value   03/29/2021 81       Health Maintenance Due   Topic Date Due   • DTaP/Tdap/Td Vaccine (1 - Tdap) Never done   • Shingles Vaccine (1 of 2) Never done   • COVID-19 Vaccine (5 - Booster for Moderna series) 07/21/2022   • Medicare Advantage- Medicare Wellness Visit  01/01/2023       Patient is due for topics as listed above but is not proceeding with Immunization(s) COVID-19, Dtap/Tdap/Td and Shingles and MWV (Medicare Wellness Visit) at this time. Education provided for Immunization(s) COVID-19, Dtap/Tdap/Td and Shingles and MWV (Medicare Wellness Visit).   immune

## 2023-05-16 ENCOUNTER — EMERGENCY (EMERGENCY)
Facility: HOSPITAL | Age: 38
LOS: 0 days | Discharge: HOME | End: 2023-05-16
Attending: EMERGENCY MEDICINE
Payer: MEDICAID

## 2023-05-16 VITALS
WEIGHT: 147.05 LBS | HEIGHT: 61 IN | DIASTOLIC BLOOD PRESSURE: 76 MMHG | HEART RATE: 88 BPM | SYSTOLIC BLOOD PRESSURE: 123 MMHG | RESPIRATION RATE: 14 BRPM | OXYGEN SATURATION: 98 % | TEMPERATURE: 98 F

## 2023-05-16 DIAGNOSIS — R20.2 PARESTHESIA OF SKIN: ICD-10-CM

## 2023-05-16 DIAGNOSIS — J45.909 UNSPECIFIED ASTHMA, UNCOMPLICATED: ICD-10-CM

## 2023-05-16 DIAGNOSIS — M54.2 CERVICALGIA: ICD-10-CM

## 2023-05-16 DIAGNOSIS — Z87.09 PERSONAL HISTORY OF OTHER DISEASES OF THE RESPIRATORY SYSTEM: ICD-10-CM

## 2023-05-16 DIAGNOSIS — Z98.51 TUBAL LIGATION STATUS: Chronic | ICD-10-CM

## 2023-05-16 DIAGNOSIS — Z98.51 TUBAL LIGATION STATUS: ICD-10-CM

## 2023-05-16 DIAGNOSIS — Z87.39 PERSONAL HISTORY OF OTHER DISEASES OF THE MUSCULOSKELETAL SYSTEM AND CONNECTIVE TISSUE: ICD-10-CM

## 2023-05-16 PROCEDURE — 99283 EMERGENCY DEPT VISIT LOW MDM: CPT

## 2023-05-16 PROCEDURE — 99282 EMERGENCY DEPT VISIT SF MDM: CPT

## 2023-05-16 NOTE — ED PROVIDER NOTE - OBJECTIVE STATEMENT
37 yo female w/ PMH of asthma presents for LUE and L neck tingling x 2 weeks. No specific inciting event or trauma, woke up with some mild neck discomfort when sxs first started. Tingling extends from L neck to L shoulder down to L hand/finger tips. No fevers, chest pain, SOB, leg pain, leg swelling, hemoptysis, hx of blood clots, recent travel/surgery/immobilization, paralysis, numbness, tingling elsewhere.  Currently has no neck pain/discomfort.

## 2023-05-16 NOTE — ED PROVIDER NOTE - NSFOLLOWUPINSTRUCTIONS_ED_ALL_ED_FT
Cervical Radiculopathy    WHAT YOU NEED TO KNOW:    Cervical radiculopathy is a painful condition that happens when a spinal nerve in your neck is pinched or irritated.     DISCHARGE INSTRUCTIONS:    Medicines: You may need any of the following:  •NSAIDs help decrease swelling and pain. This medicine can be bought without a doctor's order. This medicine can cause stomach bleeding or kidney problems in certain people. If you take blood thinner medicine, always ask your healthcare provider if NSAIDs are safe for you. Always read the medicine label and follow the directions on it before using this medicine.  •Prescription pain medicine helps decrease pain. Do not wait until the pain is severe before you take this medicine.  •Steroids help decrease pain and swelling. These may be given as a pill or as an injection in your neck. You may need more than 1 injection if your symptoms do not improve after the first treatment.   •Take your medicine as directed. Contact your healthcare provider if you think your medicine is not helping or if you have side effects. Tell him of her if you are allergic to any medicine. Keep a list of the medicines, vitamins, and herbs you take. Include the amounts, and when and why you take them. Bring the list or the pill bottles to follow-up visits. Carry your medicine list with you in case of an emergency.    Follow up with your healthcare provider or spine specialist as directed: Write down your questions so you remember to ask them during your visits.     Physical therapy: Your healthcare provider may suggest physical therapy to stretch and strengthen your muscles. Your physical therapist can teach you how to improve your posture and the way you hold your neck. He may also teach you how to be safely active and avoid further injury. He can also help you develop an exercise program that is safe for your back and neck.     Self-care:   •Ice helps decrease swelling and pain. Ice may also help prevent tissue damage. Use an ice pack, or put crushed ice in a plastic bag. Cover it with a towel and place it on your neck for 15 to 20 minutes every hour or as directed.  •Rest when you feel it is needed. Slowly start to do more each day. Return to your daily activities as directed.   •Wear a soft collar. You may be given a soft collar to support your neck while you sleep. Wear the soft collar only as directed.  •Do light stretches and regular exercise. Your healthcare provider may suggest light stretches to help decrease stiffness in your neck and arm as you recover. After your pain is controlled, you may benefit from regular exercise. Ask what type of exercise is safe for your back and neck.   •Review your work area. A comfortable work area can help prevent neck strain. Ask your employer for an ergonomic review to check the position of your desk, chair, phone, and computer. Make any necessary adjustments for your comfort.     Contact your healthcare provider or spine specialist if:   •You have a fever.  •You are losing weight without trying.  •Your pain is worse, even with medicine.  •One or both hands feel more numb than before, or you cannot move your fingers well.  •You have questions or concerns about your condition or care.

## 2023-05-16 NOTE — ED PROVIDER NOTE - NS ED ROS FT
Constitutional: No fevers, chills, or malaise.  HEENT: No headache, visual changes   Cardio: No chest pain, SOB, leg pain, leg swelling  MS:  Reports neck discomfort that resolved.   Neuro: Reports tingling. No dizziness, LOC, paralysis  Skin:  No skin rash.

## 2023-05-16 NOTE — ED PROVIDER NOTE - PHYSICAL EXAMINATION
GENERAL: NAD   SKIN: warm, dry  HEAD: Normocephalic; atraumatic.  EYES: PERRLA, EOMI  NECK: No midline or paravertebral TTP.  Full ROM in neck   CARD: S1, S2 normal; no murmurs, gallops, or rubs. Regular rate and rhythm. Radial pulse 2+/4 bilaterally   RESP: LCTAB; No wheezes, rales, rhonchi, or stridor.  MSK: Normal ROM in UEs. No TTP in UEs.   BACK: no midline or paravertebral TTP   NEURO: Alert, oriented. Strength 5/5 in bilateral UE and LEs, sensation intact. No facial droop or pronator drift. Normal gait.   PSYCH: Cooperative, appropriate.

## 2023-05-16 NOTE — ED PROVIDER NOTE - CHIEF COMPLAINT
The patient is a 38y Female complaining of neck pain. The patient is a 38y Female complaining of tingling

## 2023-05-16 NOTE — ED PROVIDER NOTE - ATTENDING CONTRIBUTION TO CARE
38 y.o. female, PMH of asthma, presents for LUE and L neck tingling x 2 weeks. No specific inciting event or trauma, woke up with some mild neck discomfort when sxs first started. Tingling extends from L neck to L shoulder down to L hand/finger tips. No fevers, chest pain, SOB, leg pain, leg swelling, hemoptysis, hx of blood clots, recent travel/surgery/immobilization, paralysis, numbness, tingling elsewhere.  Currently has no neck pain/discomfort. On exam, pt in NAD, AAOx3, head NC/AT, CN II-XII intact, PEERL, EOMi, neck (-) midline tenderness, lungs CTA B/L, CV S1S2 regular, abdomen soft/NT/ND/(+)BS, ext (-) edema, motor 5/5x4, sensation intact, ambulating with steady gait. Could be herniated disc vs muscle spasm. Will d/c with NSx follow up.

## 2023-05-16 NOTE — ED ADULT NURSE NOTE - NSFALLRISKASMTTYPE_ED_ALL_ED
Initial (On Arrival)
no back pain/no cough/no dizziness/no fever/no nausea/no shortness of breath/no syncope/no vomiting/no chills/no diaphoresis

## 2023-05-16 NOTE — ED PROVIDER NOTE - PATIENT PORTAL LINK FT
You can access the FollowMyHealth Patient Portal offered by [facility] by registering at the following website: [email or web address]. By joining [facility]’s FollowMyHealth portal, you will also be able to view your health information using other applications (apps) compatible with our system.

## 2023-05-16 NOTE — ED ADULT NURSE NOTE - NSFALLUNIVINTERV_ED_ALL_ED
Bed/Stretcher in lowest position, wheels locked, appropriate side rails in place/Call bell, personal items and telephone in reach/Instruct patient to call for assistance before getting out of bed/chair/stretcher/Non-slip footwear applied when patient is off stretcher/Ray to call system/Physically safe environment - no spills, clutter or unnecessary equipment/Purposeful proactive rounding/Room/bathroom lighting operational, light cord in reach

## 2023-05-16 NOTE — ED PROVIDER NOTE - CARE PROVIDER_API CALL
Kellie Benoit)  Neurosurgery  [address] [place] [address], [address]  [place] [address]  Phone: [phone]  Fax: [phone]  Follow Up Time: 1-3 Days

## 2023-06-12 ENCOUNTER — APPOINTMENT (OUTPATIENT)
Dept: VASCULAR SURGERY | Facility: CLINIC | Age: 38
End: 2023-06-12

## 2023-07-05 ENCOUNTER — NON-APPOINTMENT (OUTPATIENT)
Age: 38
End: 2023-07-05

## 2023-08-20 NOTE — H&P PST ADULT - RESPIRATORY
Breath Sounds equal & clear to percussion & auscultation, no accessory muscle use
3 = A little assistance

## 2023-09-19 ENCOUNTER — APPOINTMENT (OUTPATIENT)
Dept: VASCULAR SURGERY | Facility: CLINIC | Age: 38
End: 2023-09-19

## 2023-10-01 PROBLEM — G56.03 CARPAL TUNNEL SYNDROME, BILATERAL UPPER LIMBS: Status: ACTIVE | Noted: 2019-04-03

## 2023-10-04 ENCOUNTER — APPOINTMENT (OUTPATIENT)
Dept: SURGERY | Facility: CLINIC | Age: 38
End: 2023-10-04

## 2023-10-06 ENCOUNTER — APPOINTMENT (OUTPATIENT)
Dept: SURGERY | Facility: CLINIC | Age: 38
End: 2023-10-06

## 2023-11-03 ENCOUNTER — NON-APPOINTMENT (OUTPATIENT)
Age: 38
End: 2023-11-03

## 2023-11-03 ENCOUNTER — APPOINTMENT (OUTPATIENT)
Dept: ORTHOPEDIC SURGERY | Facility: CLINIC | Age: 38
End: 2023-11-03
Payer: MEDICAID

## 2023-11-03 DIAGNOSIS — S93.601A UNSPECIFIED SPRAIN OF RIGHT FOOT, INITIAL ENCOUNTER: ICD-10-CM

## 2023-11-03 PROCEDURE — 99203 OFFICE O/P NEW LOW 30 MIN: CPT | Mod: 25

## 2023-11-03 PROCEDURE — 73630 X-RAY EXAM OF FOOT: CPT | Mod: RT

## 2023-11-16 ENCOUNTER — APPOINTMENT (OUTPATIENT)
Dept: ORTHOPEDIC SURGERY | Facility: CLINIC | Age: 38
End: 2023-11-16

## 2023-12-04 NOTE — OB PROVIDER H&P - PSH
[facility] ambulatory encounter    URGENT CARE INITIAL EVALUATION    CHIEF COMPLAINT:    Chief Complaint   Patient presents with    Sore Throat     Sore throat x2 days.  Headache as well for the last 2 days. Congestion, bilateral ear pain.       SUBJECTIVE:  Frances Byrd is a 13 year old female, who presents with a complaint of sore throat for 2 days, headache, and bilateral ear pain.    Her father is present during this visit. Patient is providing her own history.     She has been having headaches, bilateral ear pain and sore throat symptoms. Denies vomiting or nausea. Denies any diarrhea. Admits to nasal congestion.         REVIEW OF SYSTEMS:  A review of systems was performed and findings relevant to this complaint are included in the HPI.    HISTORIES:  ALLERGIES:  No Known Allergies    MEDICATIONS:  Current Outpatient Medications   Medication Sig    ibuprofen (CHILDRENS ADVIL) 100 MG/5ML suspension Take by mouth every 8 hours as needed for Fever.    fluticasone (FLONASE) 50 MCG/ACT nasal spray Spray 1 spray in each nostril daily.    cetirizine (ZyrTEC) 5 MG/5ML solution Take 10 mLs by mouth daily for 14 days.     No current facility-administered medications for this visit.       I have reviewed the past medical history, family history, social history, medications and allergies listed in the medical record as obtained by my nursing staff and support staff and agree with their documentation    OBJECTIVE:  PHYSICAL EXAM:  Visit Vitals  /68   Pulse (!) 112   Temp (!) 101.9 °F (38.8 °C)   Resp (!) 22   Ht 5' 2.5\" (1.588 m)   Wt 42.4 kg (93 lb 6.4 oz)   LMP  (LMP Unknown)   SpO2 97%   BMI 16.81 kg/m²     General:  Well hydrated, well nourished female who appears in no acute distress.  Eyes:  No jaundice, injection or drainage.  Ears:  Normal canals.  Normal tympanic membranes.   No external tenderness.  Nose: Nares patent, no sinus tenderness on percussion, no purulent nasal discharge   Oral Cavity:  No trismus.   Good dentition.  No lesions of gums or hard palate.  Posterior Pharynx:  Posterior pharynx is non-erythematous and there are no tonsillar exudates bilaterally. Tonsil are 2+ bilaterally. Uvula is midline.   Neck:  No anterior and posterior lymphadenopathy.  Supple.  Lungs:  Clear to auscultation.  No wheezes, rales or rhonchi.  Skin:  No scarlatiniform rash appreciated.    URGENT CARE COURSE:  Rapid strep:  negative  Strep PCR - not detected  Covid/flu/RSV - not detected    Results for orders placed or performed in visit on 12/04/23   POCT COVID/Flu/RSV Panel via [facility]   Result Value Ref Range    POCT Rapid SARS-COV-2 by PCR Not Detected Not Detected / Detected / Presumptive Positive / Inhibitors present    POCT Influenza A by PCR Not Detected Not Detected    POCT Influenza B By PCR Not Detected Not Detected    RSV By PCR Not Detected Not Detected   POCT Rapid strep A   Result Value Ref Range    GRP A STREP Negative Negative    Internal Procedural Controls Acceptable Yes Yes    TEST LOT NUMBER [id number]     TEST LOT EXPIRATION DATE 3/18/25    POCT Streptococcus Group A PCR   Result Value Ref Range    STREPTOCOCCUS GROUP A PCR Not Detected Not Detected    TEST LOT NUMBER [id number]     TEST LOT EXPIRATION DATE 9/15/24        ASSESSMENT:  1. Flu-like symptoms    2. Sore throat    3. Fever in pediatric patient      PLAN:  Etiology is likely viral, and supportive home management is recommended only at this time. Patient was instructed to continue either Tylenol every 4-6 hours or ibuprofen every 6-8 hours as needed for fevers.  Continue to push fluids, and add oral rehydration solution like Gatorade or Pedialyte if needed.  Begin nasal saline rinses if needed for congestion.  Follow-up with PCP in 3-5 days for re-evaluation if having worsening symptoms.    Orders Placed This Encounter    POCT COVID/Flu/RSV Panel via [facility]    POCT Rapid strep A    POCT Streptococcus Group A PCR    ibuprofen (IBUPROFEN JR STRENGTH)  chewable tablet 400 mg     Often times this diagnosis can change. The provisional diagnosis that the patient is discharged with today was based on the history taken, presenting symptoms, physical exam, and/or any ancillary testing. If new symptoms occur or worsen, patient should seek immediate medical attention for re-evaluation. Follow up with Primary Care Provider as discussed in the after visit summary.    See the AVS (patient discharge instructions) section for additional instructions, follow-up plans and/or ER (emergency room) precautions discussed with the patient.     Counseled patient extensively, including the risks and benefits of treatment.  Cautioned and advised the patient about the possibility of worsening symptoms and red flags.  I answered all of the patient's questions to the best of my ability.  Patient was in agreement with treatment and discharge plan, appropriately stable at time of discharge from urgent care clinic.     Nicole Carey DO  12/4/2023  8:31 PM    FOLLOW-UP:  Vero Harrison DO     PATIENT INSTRUCTIONS:      The patient was advised to follow up with primary physician or to recheck with the urgent care clinic sooner if symptoms get worse or if new symptoms appear.    The patient and father indicated understanding of the diagnosis and agreed with the plan of care.   H/O dilation and curettage

## 2024-05-08 DIAGNOSIS — E66.9 OBESITY, UNSPECIFIED: ICD-10-CM

## 2024-05-09 ENCOUNTER — APPOINTMENT (OUTPATIENT)
Dept: SURGERY | Facility: CLINIC | Age: 39
End: 2024-05-09
Payer: MEDICAID

## 2024-05-09 ENCOUNTER — NON-APPOINTMENT (OUTPATIENT)
Age: 39
End: 2024-05-09

## 2024-05-09 VITALS
WEIGHT: 127 LBS | SYSTOLIC BLOOD PRESSURE: 104 MMHG | TEMPERATURE: 97.3 F | HEIGHT: 61 IN | DIASTOLIC BLOOD PRESSURE: 78 MMHG | HEART RATE: 79 BPM | BODY MASS INDEX: 23.98 KG/M2 | OXYGEN SATURATION: 98 %

## 2024-05-09 DIAGNOSIS — K27.9 PEPTIC ULCER, SITE UNSPECIFIED, UNSPECIFIED AS ACUTE OR CHRONIC, W/OUT HEMORRHAGE OR PERFORATION: ICD-10-CM

## 2024-05-09 PROCEDURE — 99215 OFFICE O/P EST HI 40 MIN: CPT

## 2024-05-09 RX ORDER — SUCRALFATE 1 G/1
1 TABLET ORAL
Qty: 90 | Refills: 2 | Status: ACTIVE | COMMUNITY
Start: 2024-05-09 | End: 1900-01-01

## 2024-05-09 RX ORDER — OMEPRAZOLE 40 MG/1
40 CAPSULE, DELAYED RELEASE ORAL
Qty: 60 | Refills: 0 | Status: ACTIVE | COMMUNITY
Start: 2024-05-09 | End: 1900-01-01

## 2024-05-13 NOTE — PLAN
[FreeTextEntry1] : - Recommend low acid diet (avoid soda, citrus, caffiene) - Daily omeprazole - Carafate q8hrs - Tylenol for pain, no NSAIDs - Will plan for endoscopy to evaluate for ulcers, gastritis - Follow up in 1 month

## 2024-05-13 NOTE — HISTORY OF PRESENT ILLNESS
[de-identified] : This is a 38YOF with PMH of gastric sleeve on 11/15/2021 by Dr. Lezama who presents for abdominal pain. She was originally lost to follow up due to insurance issues and has not seen a bariatric surgeon since her 1 week postop visit. She has been successful in terms of weight loss with her lowest weight being 135-140lbs which she's maintain for about 2 years. However, about 3 months ago she fell and her  caught her around her upper abdomen and since that time she has had intermittent abdominal pain. She reports the pain is brought on by meals and movement, is primarily located epigastric and wraps around to her back. She has decreased her PO intake and has lost about 15lbs as a result. She hasn't tried any medications to help the pain. Denies reflux, regurgitation, nausea, vomiting, diarrhea, constipation, fever, sick contacts.  Of note, she smokes both nicotine and non-nicotine vapes about every 2 weeks. Has 1-2 drinks every 2 weeks. She also suffered from migraines up until about 6 months ago and was taking up to 20 pills of tylenol and motrin to control them. She also drink 2-4L of soda per day.

## 2024-05-13 NOTE — REVIEW OF SYSTEMS
[Abdominal Pain] : abdominal pain [Negative] : Psychiatric [Vomiting] : no vomiting [Constipation] : no constipation [Diarrhea] : no diarrhea [Reflux/Heartburn] : no reflux/ heartburn

## 2024-05-13 NOTE — END OF VISIT
[] : Fellow [FreeTextEntry3] : As above. The patient was seen with me personally, Plan of care discussed with the patient and all questions answered. Will send PPI and carafate. Refrain from NSAIDs. Will do endoscopy. follow up in 1 month  [Time Spent: ___ minutes] : I have spent [unfilled] minutes of time on the encounter.

## 2024-05-13 NOTE — PHYSICAL EXAM
[Normal] : affect appropriate [de-identified] : Supple, trachea midline [de-identified] : S1/S2 [de-identified] : Soft, mildly TTP in upper quadrants, nondistended. No rebound, guarding or rigidity. No hernia

## 2024-05-14 LAB
25(OH)D3 SERPL-MCNC: 16 NG/ML
ALBUMIN SERPL ELPH-MCNC: 4.4 G/DL
ALP BLD-CCNC: 53 U/L
ALT SERPL-CCNC: 19 U/L
ANION GAP SERPL CALC-SCNC: 12 MMOL/L
AST SERPL-CCNC: 17 U/L
BILIRUB SERPL-MCNC: 0.3 MG/DL
BUN SERPL-MCNC: 12 MG/DL
CALCIUM SERPL-MCNC: 9.4 MG/DL
CHLORIDE SERPL-SCNC: 107 MMOL/L
CHOLEST SERPL-MCNC: 175 MG/DL
CO2 SERPL-SCNC: 25 MMOL/L
CREAT SERPL-MCNC: 0.6 MG/DL
EGFR: 118 ML/MIN/1.73M2
FERRITIN SERPL-MCNC: 19 NG/ML
FOLATE RBC-MCNC: 786 NG/ML
GLUCOSE SERPL-MCNC: 90 MG/DL
HCT VFR BLD CALC: 34.5 %
HCT VFR BLD CALC: 35.8 %
HDLC SERPL-MCNC: 45 MG/DL
HGB BLD-MCNC: 11.3 G/DL
IRON SATN MFR SERPL: 12 %
IRON SERPL-MCNC: 39 UG/DL
LDLC SERPL CALC-MCNC: 90 MG/DL
MCHC RBC-ENTMCNC: 27.1 PG
MCHC RBC-ENTMCNC: 31.6 G/DL
MCV RBC AUTO: 85.9 FL
NONHDLC SERPL-MCNC: 130 MG/DL
PLATELET # BLD AUTO: 274 K/UL
PMV BLD AUTO: 0 /100 WBCS
PMV BLD: 9.8 FL
POTASSIUM SERPL-SCNC: 3.9 MMOL/L
PROT SERPL-MCNC: 6.9 G/DL
RBC # BLD: 4.17 M/UL
RBC # FLD: 15.9 %
SODIUM SERPL-SCNC: 144 MMOL/L
TIBC SERPL-MCNC: 333 UG/DL
TRIGL SERPL-MCNC: 198 MG/DL
UIBC SERPL-MCNC: 294 UG/DL
VIT B12 SERPL-MCNC: 380 PG/ML
WBC # FLD AUTO: 8.22 K/UL

## 2024-05-14 RX ORDER — ERGOCALCIFEROL 1.25 MG/1
1.25 MG CAPSULE, LIQUID FILLED ORAL
Qty: 16 | Refills: 0 | Status: ACTIVE | COMMUNITY
Start: 2024-05-14 | End: 1900-01-01

## 2024-05-14 RX ORDER — ALBUTEROL SULFATE 90 UG/1
108 (90 BASE) AEROSOL, METERED RESPIRATORY (INHALATION)
Qty: 1 | Refills: 0 | Status: ACTIVE | COMMUNITY
Start: 2024-05-14 | End: 1900-01-01

## 2024-05-15 LAB — VIT B1 SERPL-MCNC: 74.5 NMOL/L

## 2024-05-22 ENCOUNTER — OUTPATIENT (OUTPATIENT)
Dept: OUTPATIENT SERVICES | Facility: HOSPITAL | Age: 39
LOS: 1 days | Discharge: HOME | End: 2024-05-22
Payer: MEDICAID

## 2024-05-22 ENCOUNTER — TRANSCRIPTION ENCOUNTER (OUTPATIENT)
Age: 39
End: 2024-05-22

## 2024-05-22 ENCOUNTER — APPOINTMENT (OUTPATIENT)
Dept: SURGERY | Facility: HOSPITAL | Age: 39
End: 2024-05-22

## 2024-05-22 ENCOUNTER — RESULT REVIEW (OUTPATIENT)
Age: 39
End: 2024-05-22

## 2024-05-22 VITALS
RESPIRATION RATE: 18 BRPM | DIASTOLIC BLOOD PRESSURE: 69 MMHG | HEART RATE: 75 BPM | HEIGHT: 61 IN | WEIGHT: 128.09 LBS | TEMPERATURE: 98 F | SYSTOLIC BLOOD PRESSURE: 106 MMHG

## 2024-05-22 VITALS
RESPIRATION RATE: 16 BRPM | OXYGEN SATURATION: 96 % | TEMPERATURE: 98 F | HEART RATE: 98 BPM | DIASTOLIC BLOOD PRESSURE: 66 MMHG | SYSTOLIC BLOOD PRESSURE: 106 MMHG

## 2024-05-22 DIAGNOSIS — K21.9 GASTRO-ESOPHAGEAL REFLUX DISEASE WITHOUT ESOPHAGITIS: ICD-10-CM

## 2024-05-22 DIAGNOSIS — Z98.51 TUBAL LIGATION STATUS: Chronic | ICD-10-CM

## 2024-05-22 DIAGNOSIS — K21.00 GASTRO-ESOPHAGEAL REFLUX DISEASE WITH ESOPHAGITIS, WITHOUT BLEEDING: ICD-10-CM

## 2024-05-22 DIAGNOSIS — Z98.84 BARIATRIC SURGERY STATUS: ICD-10-CM

## 2024-05-22 DIAGNOSIS — K29.50 UNSPECIFIED CHRONIC GASTRITIS WITHOUT BLEEDING: ICD-10-CM

## 2024-05-22 DIAGNOSIS — Z90.3 ACQUIRED ABSENCE OF STOMACH [PART OF]: Chronic | ICD-10-CM

## 2024-05-22 DIAGNOSIS — R10.13 EPIGASTRIC PAIN: ICD-10-CM

## 2024-05-22 DIAGNOSIS — K44.9 DIAPHRAGMATIC HERNIA WITHOUT OBSTRUCTION OR GANGRENE: ICD-10-CM

## 2024-05-22 PROCEDURE — 81025 URINE PREGNANCY TEST: CPT

## 2024-05-22 PROCEDURE — 88312 SPECIAL STAINS GROUP 1: CPT

## 2024-05-22 PROCEDURE — 88305 TISSUE EXAM BY PATHOLOGIST: CPT

## 2024-05-22 PROCEDURE — 43239 EGD BIOPSY SINGLE/MULTIPLE: CPT

## 2024-05-22 PROCEDURE — 88305 TISSUE EXAM BY PATHOLOGIST: CPT | Mod: 26

## 2024-05-22 PROCEDURE — 88312 SPECIAL STAINS GROUP 1: CPT | Mod: 26

## 2024-05-22 RX ORDER — MONTELUKAST 4 MG/1
1 TABLET, CHEWABLE ORAL
Qty: 0 | Refills: 0 | DISCHARGE

## 2024-05-22 RX ORDER — ALBUTEROL 90 UG/1
2 AEROSOL, METERED ORAL
Qty: 0 | Refills: 0 | DISCHARGE

## 2024-05-22 NOTE — ASU DISCHARGE PLAN (ADULT/PEDIATRIC) - NS MD DC FALL RISK RISK
For information on Fall & Injury Prevention, visit: [email or web address] OR  [email or web address] OR  https://www.cdc.gov/steadi/patient.html

## 2024-05-22 NOTE — CHART NOTE - NSCHARTNOTEFT_GEN_A_CORE
PACU ANESTHESIA ADMISSION NOTE      Procedure:   Post op diagnosis:      ____  Intubated  TV:______       Rate: ______      FiO2: ______    __x__  Patent Airway    _x___  Full return of protective reflexes    __x__  Full recovery from anesthesia / back to baseline     Vitals:   T:   36        R:    11              BP:    100/62              Sat:  98                 P: 74      Mental Status:  _x___ Awake   _____ Alert   _____ Drowsy   _____ Sedated    Nausea/Vomiting:  x____ NO  ______Yes,   See Post - Op Orders          Pain Scale (0-10):  ____x_    Treatment: ____ None    ____ See Post - Op/PCA Orders    Post - Operative Fluids:   _x___ Oral   ____ See Post - Op Orders    Plan: Discharge:  x ____Home       _____Floor     _____Critical Care    _____  Other:_________________    Comments:

## 2024-05-22 NOTE — ASU PATIENT PROFILE, ADULT - NS PRO ABUSE SCREEN SUSPICION NEGLECT YN
[facility] phone call message- patient requesting a refill:    Full Medication Name: methylphenidate (RITALIN SR) 20 MG CR tablet     Dose: Take two in the morning and one at lunch    Pharmacy confirmed as   [id number] - [place], [place] - [address] AT [facility] [address]  [place] [address]  Phone: [phone] Fax: [phone]  : Yes    Additional Comments: none     OK to leave a message on voice mail? Yes    Primary language: English      needed? No    Call taken on April 13, 2020 at 9:26 AM by Zachariah De Jesus  
Rx sent   
no

## 2024-05-23 LAB — SURGICAL PATHOLOGY STUDY: SIGNIFICANT CHANGE UP

## 2024-06-06 ENCOUNTER — APPOINTMENT (OUTPATIENT)
Dept: SURGERY | Facility: CLINIC | Age: 39
End: 2024-06-06

## 2024-06-26 ENCOUNTER — APPOINTMENT (OUTPATIENT)
Dept: ORTHOPEDIC SURGERY | Facility: CLINIC | Age: 39
End: 2024-06-26

## 2024-07-03 ENCOUNTER — APPOINTMENT (OUTPATIENT)
Dept: SURGERY | Facility: CLINIC | Age: 39
End: 2024-07-03

## 2024-08-27 ENCOUNTER — APPOINTMENT (OUTPATIENT)
Dept: OBGYN | Facility: CLINIC | Age: 39
End: 2024-08-27

## 2024-08-28 ENCOUNTER — APPOINTMENT (OUTPATIENT)
Dept: VASCULAR SURGERY | Facility: CLINIC | Age: 39
End: 2024-08-28

## 2024-08-28 DIAGNOSIS — E78.5 HYPERLIPIDEMIA, UNSPECIFIED: ICD-10-CM

## 2024-09-17 ENCOUNTER — APPOINTMENT (OUTPATIENT)
Dept: OBGYN | Facility: CLINIC | Age: 39
End: 2024-09-17

## 2024-10-22 NOTE — H&P PST ADULT - PSYCHIATRIC
Neck , no lymphadenopathy
Affect and characteristics of appearance, verbalizations, behaviors are appropriate

## 2025-02-18 ENCOUNTER — APPOINTMENT (OUTPATIENT)
Dept: GASTROENTEROLOGY | Facility: CLINIC | Age: 40
End: 2025-02-18

## 2025-07-10 ENCOUNTER — NON-APPOINTMENT (OUTPATIENT)
Age: 40
End: 2025-07-10

## 2025-07-10 ENCOUNTER — APPOINTMENT (OUTPATIENT)
Dept: ORTHOPEDIC SURGERY | Facility: CLINIC | Age: 40
End: 2025-07-10
Payer: MEDICAID

## 2025-07-10 PROCEDURE — 73130 X-RAY EXAM OF HAND: CPT | Mod: RT

## 2025-07-10 PROCEDURE — 99214 OFFICE O/P EST MOD 30 MIN: CPT

## 2025-07-10 RX ORDER — NABUMETONE 750 MG/1
750 TABLET, FILM COATED ORAL
Qty: 60 | Refills: 0 | Status: ACTIVE | COMMUNITY
Start: 2025-07-10 | End: 1900-01-01